# Patient Record
Sex: MALE | Race: BLACK OR AFRICAN AMERICAN | NOT HISPANIC OR LATINO | Employment: OTHER | URBAN - METROPOLITAN AREA
[De-identification: names, ages, dates, MRNs, and addresses within clinical notes are randomized per-mention and may not be internally consistent; named-entity substitution may affect disease eponyms.]

---

## 2017-05-12 ENCOUNTER — ALLSCRIPTS OFFICE VISIT (OUTPATIENT)
Dept: OTHER | Facility: OTHER | Age: 72
End: 2017-05-12

## 2017-05-12 DIAGNOSIS — N19 KIDNEY FAILURE: ICD-10-CM

## 2017-05-15 ENCOUNTER — HOSPITAL ENCOUNTER (OUTPATIENT)
Dept: ULTRASOUND IMAGING | Facility: CLINIC | Age: 72
Discharge: HOME | End: 2017-05-15
Payer: MEDICARE

## 2017-05-15 ENCOUNTER — APPOINTMENT (OUTPATIENT)
Dept: LAB | Facility: CLINIC | Age: 72
End: 2017-05-15
Payer: MEDICARE

## 2017-05-15 ENCOUNTER — TRANSCRIBE ORDERS (OUTPATIENT)
Dept: MRI IMAGING | Facility: CLINIC | Age: 72
End: 2017-05-15

## 2017-05-15 DIAGNOSIS — N19 KIDNEY FAILURE: ICD-10-CM

## 2017-05-15 LAB
ANION GAP SERPL CALCULATED.3IONS-SCNC: 10 MMOL/L (ref 4–13)
BACTERIA UR QL AUTO: NORMAL /HPF
BILIRUB UR QL STRIP: NEGATIVE
BUN SERPL-MCNC: 15 MG/DL (ref 5–25)
CALCIUM SERPL-MCNC: 8.9 MG/DL (ref 8.3–10.1)
CHLORIDE SERPL-SCNC: 102 MMOL/L (ref 100–108)
CLARITY UR: CLEAR
CO2 SERPL-SCNC: 26 MMOL/L (ref 21–32)
COLOR UR: YELLOW
CREAT SERPL-MCNC: 1.33 MG/DL (ref 0.6–1.3)
CREAT UR-MCNC: 216 MG/DL
ERYTHROCYTE [DISTWIDTH] IN BLOOD BY AUTOMATED COUNT: 14.7 % (ref 11.6–15.1)
GFR SERPL CREATININE-BSD FRML MDRD: >60 ML/MIN/1.73SQ M
GLUCOSE P FAST SERPL-MCNC: 236 MG/DL (ref 65–99)
GLUCOSE UR STRIP-MCNC: NEGATIVE MG/DL
HCT VFR BLD AUTO: 35.3 % (ref 36.5–49.3)
HGB BLD-MCNC: 11.5 G/DL (ref 12–17)
HGB UR QL STRIP.AUTO: NEGATIVE
HYALINE CASTS #/AREA URNS LPF: NORMAL /LPF
KETONES UR STRIP-MCNC: NEGATIVE MG/DL
LEUKOCYTE ESTERASE UR QL STRIP: NEGATIVE
MCH RBC QN AUTO: 26.4 PG (ref 26.8–34.3)
MCHC RBC AUTO-ENTMCNC: 32.6 G/DL (ref 31.4–37.4)
MCV RBC AUTO: 81 FL (ref 82–98)
NITRITE UR QL STRIP: NEGATIVE
NON-SQ EPI CELLS URNS QL MICRO: NORMAL /HPF
PH UR STRIP.AUTO: 6 [PH] (ref 4.5–8)
PHOSPHATE SERPL-MCNC: 3.4 MG/DL (ref 2.3–4.1)
PLATELET # BLD AUTO: 328 THOUSANDS/UL (ref 149–390)
PMV BLD AUTO: 11.4 FL (ref 8.9–12.7)
POTASSIUM SERPL-SCNC: 4.2 MMOL/L (ref 3.5–5.3)
PROT UR STRIP-MCNC: ABNORMAL MG/DL
PROT UR-MCNC: 65 MG/DL
PROT/CREAT UR: 0.3 MG/G{CREAT} (ref 0–0.1)
PTH-INTACT SERPL-MCNC: 71.7 PG/ML (ref 14–72)
RBC # BLD AUTO: 4.35 MILLION/UL (ref 3.88–5.62)
RBC #/AREA URNS AUTO: NORMAL /HPF
SODIUM SERPL-SCNC: 138 MMOL/L (ref 136–145)
SP GR UR STRIP.AUTO: 1.02 (ref 1–1.03)
UROBILINOGEN UR QL STRIP.AUTO: 0.2 E.U./DL
WBC # BLD AUTO: 9.67 THOUSAND/UL (ref 4.31–10.16)
WBC #/AREA URNS AUTO: NORMAL /HPF

## 2017-05-15 PROCEDURE — 83970 ASSAY OF PARATHORMONE: CPT

## 2017-05-15 PROCEDURE — 81001 URINALYSIS AUTO W/SCOPE: CPT

## 2017-05-15 PROCEDURE — 76770 US EXAM ABDO BACK WALL COMP: CPT

## 2017-05-15 PROCEDURE — 82570 ASSAY OF URINE CREATININE: CPT

## 2017-05-15 PROCEDURE — 84156 ASSAY OF PROTEIN URINE: CPT

## 2017-05-15 PROCEDURE — 36415 COLL VENOUS BLD VENIPUNCTURE: CPT

## 2017-05-15 PROCEDURE — 85027 COMPLETE CBC AUTOMATED: CPT

## 2017-05-15 PROCEDURE — 80048 BASIC METABOLIC PNL TOTAL CA: CPT

## 2017-05-15 PROCEDURE — 84100 ASSAY OF PHOSPHORUS: CPT

## 2017-06-12 ENCOUNTER — ALLSCRIPTS OFFICE VISIT (OUTPATIENT)
Dept: OTHER | Facility: OTHER | Age: 72
End: 2017-06-12

## 2018-01-10 NOTE — MISCELLANEOUS
Provider Comments  Provider Comments:   PT NO SHOW/MISSED APPOINTMENT HAS NOT CALLED BACK TO RESCHEDULE A LETTER WILL BE SENT HOME TO PT ABOUT MISSED APPOINTMENT        Signatures   Electronically signed by : SARITHA Nguyễn ; Jun 12 2017 11:32AM EST                       (Author)

## 2018-01-15 VITALS
WEIGHT: 240 LBS | HEART RATE: 68 BPM | HEIGHT: 72 IN | DIASTOLIC BLOOD PRESSURE: 70 MMHG | SYSTOLIC BLOOD PRESSURE: 110 MMHG | BODY MASS INDEX: 32.51 KG/M2 | RESPIRATION RATE: 16 BRPM | TEMPERATURE: 98.3 F

## 2021-03-05 ENCOUNTER — HOSPITAL ENCOUNTER (EMERGENCY)
Facility: HOSPITAL | Age: 76
Discharge: HOME | End: 2021-03-05
Attending: EMERGENCY MEDICINE
Payer: MEDICARE

## 2021-03-05 ENCOUNTER — APPOINTMENT (EMERGENCY)
Dept: CT IMAGING | Facility: HOSPITAL | Age: 76
End: 2021-03-05
Payer: MEDICARE

## 2021-03-05 VITALS
TEMPERATURE: 97.8 F | HEART RATE: 97 BPM | DIASTOLIC BLOOD PRESSURE: 87 MMHG | OXYGEN SATURATION: 99 % | RESPIRATION RATE: 21 BRPM | SYSTOLIC BLOOD PRESSURE: 158 MMHG

## 2021-03-05 DIAGNOSIS — K62.89 RECTAL PAIN: ICD-10-CM

## 2021-03-05 DIAGNOSIS — K59.00 CONSTIPATION: Primary | ICD-10-CM

## 2021-03-05 LAB
ALBUMIN SERPL BCP-MCNC: 3.7 G/DL (ref 3.5–5)
ALP SERPL-CCNC: 86 U/L (ref 46–116)
ALT SERPL W P-5'-P-CCNC: 18 U/L (ref 12–78)
ANION GAP SERPL CALCULATED.3IONS-SCNC: 14 MMOL/L (ref 4–13)
AST SERPL W P-5'-P-CCNC: 16 U/L (ref 5–45)
BASOPHILS # BLD AUTO: 0.07 THOUSANDS/ΜL (ref 0–0.1)
BASOPHILS NFR BLD AUTO: 1 % (ref 0–1)
BILIRUB SERPL-MCNC: 0.36 MG/DL (ref 0.2–1)
BUN SERPL-MCNC: 16 MG/DL (ref 5–25)
CALCIUM SERPL-MCNC: 9.1 MG/DL (ref 8.3–10.1)
CHLORIDE SERPL-SCNC: 101 MMOL/L (ref 100–108)
CO2 SERPL-SCNC: 24 MMOL/L (ref 21–32)
CREAT SERPL-MCNC: 1.58 MG/DL (ref 0.6–1.3)
EOSINOPHIL # BLD AUTO: 0.05 THOUSAND/ΜL (ref 0–0.61)
EOSINOPHIL NFR BLD AUTO: 0 % (ref 0–6)
ERYTHROCYTE [DISTWIDTH] IN BLOOD BY AUTOMATED COUNT: 14.7 % (ref 11.6–15.1)
GFR SERPL CREATININE-BSD FRML MDRD: 48 ML/MIN/1.73SQ M
GLUCOSE SERPL-MCNC: 275 MG/DL (ref 65–140)
HCT VFR BLD AUTO: 41.1 % (ref 36.5–49.3)
HGB BLD-MCNC: 12.9 G/DL (ref 12–17)
IMM GRANULOCYTES # BLD AUTO: 0.05 THOUSAND/UL (ref 0–0.2)
IMM GRANULOCYTES NFR BLD AUTO: 0 % (ref 0–2)
LIPASE SERPL-CCNC: 52 U/L (ref 73–393)
LYMPHOCYTES # BLD AUTO: 3.93 THOUSANDS/ΜL (ref 0.6–4.47)
LYMPHOCYTES NFR BLD AUTO: 32 % (ref 14–44)
MCH RBC QN AUTO: 26.7 PG (ref 26.8–34.3)
MCHC RBC AUTO-ENTMCNC: 31.4 G/DL (ref 31.4–37.4)
MCV RBC AUTO: 85 FL (ref 82–98)
MONOCYTES # BLD AUTO: 0.78 THOUSAND/ΜL (ref 0.17–1.22)
MONOCYTES NFR BLD AUTO: 6 % (ref 4–12)
NEUTROPHILS # BLD AUTO: 7.48 THOUSANDS/ΜL (ref 1.85–7.62)
NEUTS SEG NFR BLD AUTO: 61 % (ref 43–75)
NRBC BLD AUTO-RTO: 0 /100 WBCS
PLATELET # BLD AUTO: 335 THOUSANDS/UL (ref 149–390)
PMV BLD AUTO: 10.3 FL (ref 8.9–12.7)
POTASSIUM SERPL-SCNC: 3.9 MMOL/L (ref 3.5–5.3)
PROT SERPL-MCNC: 7.8 G/DL (ref 6.4–8.2)
RBC # BLD AUTO: 4.83 MILLION/UL (ref 3.88–5.62)
SODIUM SERPL-SCNC: 139 MMOL/L (ref 136–145)
WBC # BLD AUTO: 12.36 THOUSAND/UL (ref 4.31–10.16)

## 2021-03-05 PROCEDURE — 96360 HYDRATION IV INFUSION INIT: CPT

## 2021-03-05 PROCEDURE — 99284 EMERGENCY DEPT VISIT MOD MDM: CPT | Performed by: PHYSICIAN ASSISTANT

## 2021-03-05 PROCEDURE — 74177 CT ABD & PELVIS W/CONTRAST: CPT

## 2021-03-05 PROCEDURE — 96361 HYDRATE IV INFUSION ADD-ON: CPT

## 2021-03-05 PROCEDURE — 83690 ASSAY OF LIPASE: CPT | Performed by: PHYSICIAN ASSISTANT

## 2021-03-05 PROCEDURE — 36415 COLL VENOUS BLD VENIPUNCTURE: CPT | Performed by: PHYSICIAN ASSISTANT

## 2021-03-05 PROCEDURE — 85025 COMPLETE CBC W/AUTO DIFF WBC: CPT | Performed by: PHYSICIAN ASSISTANT

## 2021-03-05 PROCEDURE — 80053 COMPREHEN METABOLIC PANEL: CPT | Performed by: PHYSICIAN ASSISTANT

## 2021-03-05 PROCEDURE — 99284 EMERGENCY DEPT VISIT MOD MDM: CPT

## 2021-03-05 RX ORDER — SENNOSIDES 8.6 MG
8.6 TABLET ORAL
Qty: 21 TABLET | Refills: 0 | Status: SHIPPED | OUTPATIENT
Start: 2021-03-05 | End: 2021-03-26

## 2021-03-05 RX ORDER — HYDROCORTISONE 25 MG/G
CREAM TOPICAL 2 TIMES DAILY
Qty: 28 G | Refills: 0 | Status: SHIPPED | OUTPATIENT
Start: 2021-03-05

## 2021-03-05 RX ORDER — POLYETHYLENE GLYCOL 3350 17 G/17G
17 POWDER, FOR SOLUTION ORAL DAILY
Qty: 116 G | Refills: 0 | Status: SHIPPED | OUTPATIENT
Start: 2021-03-05

## 2021-03-05 RX ADMIN — IOHEXOL 100 ML: 350 INJECTION, SOLUTION INTRAVENOUS at 22:24

## 2021-03-05 RX ADMIN — SODIUM CHLORIDE 1000 ML: 0.9 INJECTION, SOLUTION INTRAVENOUS at 20:54

## 2021-03-06 NOTE — ED PROVIDER NOTES
History  Chief Complaint   Patient presents with    Constipation     Pt reports having constipation x 2 weeks  66 yo with constipation  States last BM was about 2 weeks ago  He has had issues with constipation in the past but never gone this long  Has tried OTC meds with no relief  Incidentally upon arrival the pt had a BM  He continues to have abd pain  No n/v  No fever  Complains of pain and inflammation along rectum  History provided by:  Patient   used: No    Constipation  Severity:  Severe  Time since last bowel movement:  2 weeks  Timing:  Constant  Progression:  Worsening  Chronicity:  New  Context: not dehydration, not dietary changes, not medication, not narcotics and not stress    Stool description:  None produced  Relieved by:  Nothing  Worsened by:  Nothing  Ineffective treatments:  None tried  Associated symptoms: no abdominal pain, no diarrhea, no dysuria, no fever, no nausea and no vomiting        None       Past Medical History:   Diagnosis Date    Diabetes mellitus ([facility] [address] )     Hypertension        History reviewed  No pertinent surgical history  History reviewed  No pertinent family history  I have reviewed and agree with the history as documented  E-Cigarette/Vaping    E-Cigarette Use Never User      E-Cigarette/Vaping Substances     Social History     Tobacco Use    Smoking status: Never Smoker    Smokeless tobacco: Never Used   Substance Use Topics    Alcohol use: Never     Frequency: Never    Drug use: Never       Review of Systems   Constitutional: Negative for activity change, appetite change, chills, diaphoresis, fatigue, fever and unexpected weight change  HENT: Negative for congestion, rhinorrhea, sinus pressure, sore throat and trouble swallowing  Eyes: Negative for photophobia and visual disturbance  Respiratory: Negative for apnea, cough, choking, chest tightness, shortness of breath, wheezing and stridor      Cardiovascular: Negative for chest pain, palpitations and leg swelling  Gastrointestinal: Positive for constipation  Negative for abdominal distention, abdominal pain, blood in stool, diarrhea, nausea and vomiting  Genitourinary: Negative for decreased urine volume, difficulty urinating, dysuria, enuresis, flank pain, frequency, hematuria and urgency  Musculoskeletal: Negative for arthralgias, myalgias, neck pain and neck stiffness  Skin: Negative for color change, pallor, rash and wound  Allergic/Immunologic: Negative  Neurological: Negative for dizziness, tremors, syncope, weakness, light-headedness, numbness and headaches  Hematological: Negative  Psychiatric/Behavioral: Negative  All other systems reviewed and are negative  Physical Exam  Physical Exam  Vitals signs and nursing note reviewed  Constitutional:       General: He is not in acute distress  Appearance: Normal appearance  He is well-developed  He is not ill-appearing, toxic-appearing or diaphoretic  HENT:      Head: Normocephalic and atraumatic  Eyes:      General: Lids are normal       Pupils: Pupils are equal, round, and reactive to light  Neck:      Musculoskeletal: Normal range of motion and neck supple  Cardiovascular:      Rate and Rhythm: Normal rate and regular rhythm  Pulses: Normal pulses  No decreased pulses  Radial pulses are 2+ on the right side and 2+ on the left side  Heart sounds: Normal heart sounds, S1 normal and S2 normal  Heart sounds not distant  No murmur  No friction rub  No gallop  Pulmonary:      Effort: Pulmonary effort is normal  No tachypnea, bradypnea, accessory muscle usage or respiratory distress  Breath sounds: Normal breath sounds  No decreased breath sounds, wheezing, rhonchi or rales  Abdominal:      General: There is no distension  Palpations: Abdomen is soft  Abdomen is not rigid  Tenderness: There is generalized abdominal tenderness   There is no guarding or rebound  Musculoskeletal: Normal range of motion  General: No tenderness or deformity  Skin:     General: Skin is warm and dry  Coloration: Skin is not pale  Findings: No erythema or rash  Neurological:      Mental Status: He is alert and oriented to person, place, and time  GCS: GCS eye subscore is 4  GCS verbal subscore is 5  GCS motor subscore is 6  Cranial Nerves: No cranial nerve deficit     Psychiatric:         Speech: Speech normal          Vital Signs  ED Triage Vitals [03/05/21 1620]   Temperature Pulse Respirations Blood Pressure SpO2   97 8 °F (36 6 °C) 102 18 161/86 99 %      Temp Source Heart Rate Source Patient Position - Orthostatic VS BP Location FiO2 (%)   Oral Monitor Sitting Left arm --      Pain Score       --           Vitals:    03/05/21 1620 03/05/21 2325   BP: 161/86 158/87   Pulse: 102 97   Patient Position - Orthostatic VS: Sitting Lying         Visual Acuity      ED Medications  Medications   sodium chloride 0 9 % bolus 1,000 mL (0 mL Intravenous Stopped 3/5/21 2300)   iohexol (OMNIPAQUE) 350 MG/ML injection (MULTI-DOSE) 100 mL (100 mL Intravenous Given 3/5/21 2224)       Diagnostic Studies  Results Reviewed     Procedure Component Value Units Date/Time    Lipase [[id number]]  (Abnormal) Collected: 03/05/21 2052    Lab Status: Final result Specimen: Blood from Arm, Right Updated: 03/05/21 2109     Lipase 52 u/L     Comprehensive metabolic panel [[id number]]  (Abnormal) Collected: 03/05/21 2052    Lab Status: Final result Specimen: Blood from Arm, Right Updated: 03/05/21 2109     Sodium 139 mmol/L      Potassium 3 9 mmol/L      Chloride 101 mmol/L      CO2 24 mmol/L      ANION GAP 14 mmol/L      BUN 16 mg/dL      Creatinine 1 58 mg/dL      Glucose 275 mg/dL      Calcium 9 1 mg/dL      AST 16 U/L      ALT 18 U/L      Alkaline Phosphatase 86 U/L      Total Protein 7 8 g/dL      Albumin 3 7 g/dL      Total Bilirubin 0 36 mg/dL      eGFR 48 ml/min/1 73sq m Narrative:      National Kidney Disease Foundation guidelines for Chronic Kidney Disease (CKD):     Stage 1 with normal or high GFR (GFR > 90 mL/min/1 73 square meters)    Stage 2 Mild CKD (GFR = 60-89 mL/min/1 73 square meters)    Stage 3A Moderate CKD (GFR = 45-59 mL/min/1 73 square meters)    Stage 3B Moderate CKD (GFR = 30-44 mL/min/1 73 square meters)    Stage 4 Severe CKD (GFR = 15-29 mL/min/1 73 square meters)    Stage 5 End Stage CKD (GFR <15 mL/min/1 73 square meters)  Note: GFR calculation is accurate only with a steady state creatinine    CBC and differential [[id number]]  (Abnormal) Collected: 03/05/21 2052    Lab Status: Final result Specimen: Blood from Arm, Right Updated: 03/05/21 2056     WBC 12 36 Thousand/uL      RBC 4 83 Million/uL      Hemoglobin 12 9 g/dL      Hematocrit 41 1 %      MCV 85 fL      MCH 26 7 pg      MCHC 31 4 g/dL      RDW 14 7 %      MPV 10 3 fL      Platelets 346 Thousands/uL      nRBC 0 /100 WBCs      Neutrophils Relative 61 %      Immat GRANS % 0 %      Lymphocytes Relative 32 %      Monocytes Relative 6 %      Eosinophils Relative 0 %      Basophils Relative 1 %      Neutrophils Absolute 7 48 Thousands/µL      Immature Grans Absolute 0 05 Thousand/uL      Lymphocytes Absolute 3 93 Thousands/µL      Monocytes Absolute 0 78 Thousand/µL      Eosinophils Absolute 0 05 Thousand/µL      Basophils Absolute 0 07 Thousands/µL                  CT abdomen pelvis with contrast   Final Result by Morales Patiño MD (03/05 2326)      1  Rectum is distended with fecal material and there is circumferential rectal wall thickening with surrounding fat stranding suspicious for stercoral colitis  2   Abundant fecal material throughout the colon, in keeping with history of constipation  The study was marked in [facility] for immediate notification              Workstation performed: [id number]                    Procedures  Procedures         ED Course MDM  Number of Diagnoses or Management Options  Constipation: new and requires workup  Rectal pain: new and requires workup  Diagnosis management comments: DDx including but not limited to: appendicitis, gastroenteritis, gastritis, PUD, GERD, gastroparesis, hepatitis, pancreatitis, colitis, enteritis, diverticulitis, food poisoning, mesenteric adenitis, mesenteric ischemia, IBD, IBS, ileus, bowel obstruction, volvulus, internal hernia, cholecystitis, biliary colic, choledocholithiasis, perforated viscus, tumor, splenic etiology, constipation, AAA, testicular torsion, renal colic, pyelonephritis, UTI; doubt cardiac etiology  Plan: Had a bm here  Will check CT  Labs  Fluids  Dispo pending  Amount and/or Complexity of Data Reviewed  Clinical lab tests: ordered and reviewed  Tests in the radiology section of CPT®: ordered and reviewed  Independent visualization of images, tracings, or specimens: yes    Risk of Complications, Morbidity, and/or Mortality  Presenting problems: moderate  Management options: low  General comments: 66 yo with abd pain and constipation  Had a BM here without any intervention  Feeling better  CT still with persistent stool burden  Will start miralax  Follow up PCP  Senokot  Follow up PCP  Anusol for rectal pain/proctitis  Incidental CT findings discussed with pt including renal cysts  Return parameters provided  Pt understands and agrees with plan        Patient Progress  Patient progress: stable      Disposition  Final diagnoses:   Constipation   Rectal pain     Time reflects when diagnosis was documented in both MDM as applicable and the Disposition within this note     Time User Action Codes Description Comment    3/5/2021 11:34 PM Tristan Foil Add [K59 00] Constipation     3/5/2021 11:34 PM Tristan Foil Add [K62 89] Rectal pain       ED Disposition     ED Disposition Condition Date/Time Comment    Discharge Stable Fri Mar 5, 2021 11:34 PM Lemuel Mondragon discharge to home/self care  Follow-up Information     Follow up With Specialties Details Why [address] MD Internal Medicine Call in 1 day  [address] [address]  [phone]            Discharge Medication List as of 3/5/2021 11:36 PM      START taking these medications    Details   hydrocortisone (ANUSOL-HC) 2 5 % rectal cream Apply topically 2 (two) times a day, Starting Fri 3/5/2021, Print      polyethylene glycol (GLYCOLAX) 17 GM/SCOOP powder Take 17 g by mouth daily, Starting Fri 3/5/2021, Print      senna (SENOKOT) 8 6 mg Take 1 tablet (8 6 mg total) by mouth daily at bedtime for 21 days, Starting Fri 3/5/2021, Until Fri 3/26/2021, Print           No discharge procedures on file      PDMP Review     None          ED Provider  Electronically Signed by           Thea Mukherjee PA-C  03/06/21 0008

## 2024-09-17 ENCOUNTER — APPOINTMENT (EMERGENCY)
Dept: RADIOLOGY | Facility: HOSPITAL | Age: 79
DRG: 178 | End: 2024-09-17
Payer: COMMERCIAL

## 2024-09-17 ENCOUNTER — HOSPITAL ENCOUNTER (INPATIENT)
Facility: HOSPITAL | Age: 79
LOS: 2 days | Discharge: HOME HEALTH | DRG: 178 | End: 2024-09-19
Attending: EMERGENCY MEDICINE | Admitting: STUDENT IN AN ORGANIZED HEALTH CARE EDUCATION/TRAINING PROGRAM
Payer: COMMERCIAL

## 2024-09-17 ENCOUNTER — APPOINTMENT (EMERGENCY)
Dept: CT IMAGING | Facility: HOSPITAL | Age: 79
DRG: 178 | End: 2024-09-17
Payer: COMMERCIAL

## 2024-09-17 ENCOUNTER — APPOINTMENT (INPATIENT)
Dept: NON INVASIVE DIAGNOSTICS | Facility: HOSPITAL | Age: 79
DRG: 178 | End: 2024-09-17
Payer: COMMERCIAL

## 2024-09-17 DIAGNOSIS — G25.3 MYOCLONUS: ICD-10-CM

## 2024-09-17 DIAGNOSIS — I10 HTN (HYPERTENSION): ICD-10-CM

## 2024-09-17 DIAGNOSIS — U07.1 ACUTE COVID-19: Primary | ICD-10-CM

## 2024-09-17 DIAGNOSIS — N17.9 AKI (ACUTE KIDNEY INJURY) (HCC): ICD-10-CM

## 2024-09-17 DIAGNOSIS — R53.1 WEAKNESS: ICD-10-CM

## 2024-09-17 DIAGNOSIS — R25.1 TREMOR: ICD-10-CM

## 2024-09-17 DIAGNOSIS — E08.21 DIABETES MELLITUS DUE TO UNDERLYING CONDITION WITH DIABETIC NEPHROPATHY, UNSPECIFIED WHETHER LONG TERM INSULIN USE (HCC): ICD-10-CM

## 2024-09-17 DIAGNOSIS — U07.1 COVID: ICD-10-CM

## 2024-09-17 DIAGNOSIS — E11.9 DM (DIABETES MELLITUS) (HCC): ICD-10-CM

## 2024-09-17 LAB
2HR DELTA HS TROPONIN: 0 NG/L
ALBUMIN SERPL BCG-MCNC: 3.9 G/DL (ref 3.5–5)
ALP SERPL-CCNC: 57 U/L (ref 34–104)
ALT SERPL W P-5'-P-CCNC: 7 U/L (ref 7–52)
ANION GAP SERPL CALCULATED.3IONS-SCNC: 9 MMOL/L (ref 4–13)
AORTIC ROOT: 3.2 CM
APICAL FOUR CHAMBER EJECTION FRACTION: 72 %
APTT PPP: 20 SECONDS (ref 23–34)
ASCENDING AORTA: 3.7 CM
AST SERPL W P-5'-P-CCNC: 17 U/L (ref 13–39)
ATRIAL RATE: 70 BPM
BACTERIA UR QL AUTO: NORMAL /HPF
BASOPHILS # BLD AUTO: 0.05 THOUSANDS/ΜL (ref 0–0.1)
BASOPHILS NFR BLD AUTO: 1 % (ref 0–1)
BILIRUB SERPL-MCNC: 0.47 MG/DL (ref 0.2–1)
BILIRUB UR QL STRIP: NEGATIVE
BSA FOR ECHO PROCEDURE: 2.22 M2
BUN SERPL-MCNC: 15 MG/DL (ref 5–25)
CALCIUM SERPL-MCNC: 8.6 MG/DL (ref 8.4–10.2)
CARDIAC TROPONIN I PNL SERPL HS: 4 NG/L
CARDIAC TROPONIN I PNL SERPL HS: 4 NG/L
CHLORIDE SERPL-SCNC: 102 MMOL/L (ref 96–108)
CLARITY UR: CLEAR
CO2 SERPL-SCNC: 26 MMOL/L (ref 21–32)
COLOR UR: YELLOW
CREAT SERPL-MCNC: 1.38 MG/DL (ref 0.6–1.3)
E WAVE DECELERATION TIME: 417 MS
E/A RATIO: 0.71
EOSINOPHIL # BLD AUTO: 0.17 THOUSAND/ΜL (ref 0–0.61)
EOSINOPHIL NFR BLD AUTO: 2 % (ref 0–6)
ERYTHROCYTE [DISTWIDTH] IN BLOOD BY AUTOMATED COUNT: 13.1 % (ref 11.6–15.1)
FLUAV RNA RESP QL NAA+PROBE: NEGATIVE
FLUBV RNA RESP QL NAA+PROBE: NEGATIVE
FRACTIONAL SHORTENING: 43 (ref 28–44)
GFR SERPL CREATININE-BSD FRML MDRD: 48 ML/MIN/1.73SQ M
GLUCOSE SERPL-MCNC: 103 MG/DL (ref 65–140)
GLUCOSE SERPL-MCNC: 141 MG/DL (ref 65–140)
GLUCOSE SERPL-MCNC: 161 MG/DL (ref 65–140)
GLUCOSE UR STRIP-MCNC: NEGATIVE MG/DL
HCT VFR BLD AUTO: 36.9 % (ref 36.5–49.3)
HGB BLD-MCNC: 11.8 G/DL (ref 12–17)
HGB UR QL STRIP.AUTO: NEGATIVE
IMM GRANULOCYTES # BLD AUTO: 0.04 THOUSAND/UL (ref 0–0.2)
IMM GRANULOCYTES NFR BLD AUTO: 1 % (ref 0–2)
INR PPP: 0.98 (ref 0.85–1.19)
INTERVENTRICULAR SEPTUM IN DIASTOLE (PARASTERNAL SHORT AXIS VIEW): 1 CM
INTERVENTRICULAR SEPTUM: 1 CM (ref 0.6–1.1)
KETONES UR STRIP-MCNC: NEGATIVE MG/DL
LA/AORTA RATIO 2D: 1.22
LAAS-AP2: 10.2 CM2
LAAS-AP4: 22.9 CM2
LACTATE SERPL-SCNC: 1 MMOL/L (ref 0.5–2)
LEFT ATRIUM SIZE: 3.9 CM
LEFT INTERNAL DIMENSION IN SYSTOLE: 3.3 CM (ref 2.1–4)
LEFT VENTRICULAR INTERNAL DIMENSION IN DIASTOLE: 5.8 CM (ref 3.5–6)
LEFT VENTRICULAR POSTERIOR WALL IN END DIASTOLE: 1 CM
LEFT VENTRICULAR STROKE VOLUME: 125 ML
LEUKOCYTE ESTERASE UR QL STRIP: NEGATIVE
LVSV (TEICH): 125 ML
LYMPHOCYTES # BLD AUTO: 2.7 THOUSANDS/ΜL (ref 0.6–4.47)
LYMPHOCYTES NFR BLD AUTO: 36 % (ref 14–44)
MAGNESIUM SERPL-MCNC: 1.9 MG/DL (ref 1.9–2.7)
MCH RBC QN AUTO: 27.1 PG (ref 26.8–34.3)
MCHC RBC AUTO-ENTMCNC: 32 G/DL (ref 31.4–37.4)
MCV RBC AUTO: 85 FL (ref 82–98)
MONOCYTES # BLD AUTO: 0.67 THOUSAND/ΜL (ref 0.17–1.22)
MONOCYTES NFR BLD AUTO: 9 % (ref 4–12)
MV E'TISSUE VEL-SEP: 5 CM/S
MV PEAK A VEL: 0.89 M/S
MV PEAK E VEL: 63 CM/S
MV STENOSIS PRESSURE HALF TIME: 122 MS
MV VALVE AREA P 1/2 METHOD: 1.8
NEUTROPHILS # BLD AUTO: 3.94 THOUSANDS/ΜL (ref 1.85–7.62)
NEUTS SEG NFR BLD AUTO: 51 % (ref 43–75)
NITRITE UR QL STRIP: NEGATIVE
NON-SQ EPI CELLS URNS QL MICRO: NORMAL /HPF
NRBC BLD AUTO-RTO: 0 /100 WBCS
P AXIS: 37 DEGREES
PH UR STRIP.AUTO: 5.5 [PH]
PLATELET # BLD AUTO: 247 THOUSANDS/UL (ref 149–390)
PMV BLD AUTO: 9.4 FL (ref 8.9–12.7)
POTASSIUM SERPL-SCNC: 4.1 MMOL/L (ref 3.5–5.3)
PR INTERVAL: 188 MS
PROT SERPL-MCNC: 7 G/DL (ref 6.4–8.4)
PROT UR STRIP-MCNC: ABNORMAL MG/DL
PROTHROMBIN TIME: 13.7 SECONDS (ref 12.3–15)
QRS AXIS: -57 DEGREES
QRSD INTERVAL: 124 MS
QT INTERVAL: 438 MS
QTC INTERVAL: 486 MS
RBC # BLD AUTO: 4.36 MILLION/UL (ref 3.88–5.62)
RBC #/AREA URNS AUTO: NORMAL /HPF
RIGHT VENTRICLE ID DIMENSION: 3.1 CM
RSV RNA RESP QL NAA+PROBE: NEGATIVE
SARS-COV-2 RNA RESP QL NAA+PROBE: POSITIVE
SL CV LV EF: 55
SL CV PED ECHO LEFT VENTRICLE DIASTOLIC VOLUME (MOD BIPLANE) 2D: 168 ML
SL CV PED ECHO LEFT VENTRICLE SYSTOLIC VOLUME (MOD BIPLANE) 2D: 43 ML
SODIUM SERPL-SCNC: 137 MMOL/L (ref 135–147)
SP GR UR STRIP.AUTO: 1.02 (ref 1–1.03)
T WAVE AXIS: 40 DEGREES
TRICUSPID ANNULAR PLANE SYSTOLIC EXCURSION: 2.3 CM
TSH SERPL DL<=0.05 MIU/L-ACNC: 2.08 UIU/ML (ref 0.45–4.5)
UROBILINOGEN UR STRIP-ACNC: <2 MG/DL
VENTRICULAR RATE: 74 BPM
WBC # BLD AUTO: 7.57 THOUSAND/UL (ref 4.31–10.16)
WBC #/AREA URNS AUTO: NORMAL /HPF

## 2024-09-17 PROCEDURE — 70496 CT ANGIOGRAPHY HEAD: CPT

## 2024-09-17 PROCEDURE — 99285 EMERGENCY DEPT VISIT HI MDM: CPT

## 2024-09-17 PROCEDURE — 84484 ASSAY OF TROPONIN QUANT: CPT | Performed by: EMERGENCY MEDICINE

## 2024-09-17 PROCEDURE — 81001 URINALYSIS AUTO W/SCOPE: CPT | Performed by: EMERGENCY MEDICINE

## 2024-09-17 PROCEDURE — 93005 ELECTROCARDIOGRAM TRACING: CPT

## 2024-09-17 PROCEDURE — 70498 CT ANGIOGRAPHY NECK: CPT

## 2024-09-17 PROCEDURE — 36415 COLL VENOUS BLD VENIPUNCTURE: CPT | Performed by: EMERGENCY MEDICINE

## 2024-09-17 PROCEDURE — 99223 1ST HOSP IP/OBS HIGH 75: CPT | Performed by: STUDENT IN AN ORGANIZED HEALTH CARE EDUCATION/TRAINING PROGRAM

## 2024-09-17 PROCEDURE — 82948 REAGENT STRIP/BLOOD GLUCOSE: CPT

## 2024-09-17 PROCEDURE — 85730 THROMBOPLASTIN TIME PARTIAL: CPT | Performed by: EMERGENCY MEDICINE

## 2024-09-17 PROCEDURE — 85610 PROTHROMBIN TIME: CPT | Performed by: EMERGENCY MEDICINE

## 2024-09-17 PROCEDURE — 85025 COMPLETE CBC W/AUTO DIFF WBC: CPT | Performed by: EMERGENCY MEDICINE

## 2024-09-17 PROCEDURE — 0241U HB NFCT DS VIR RESP RNA 4 TRGT: CPT | Performed by: EMERGENCY MEDICINE

## 2024-09-17 PROCEDURE — 93306 TTE W/DOPPLER COMPLETE: CPT | Performed by: INTERNAL MEDICINE

## 2024-09-17 PROCEDURE — 80053 COMPREHEN METABOLIC PANEL: CPT | Performed by: EMERGENCY MEDICINE

## 2024-09-17 PROCEDURE — 96365 THER/PROPH/DIAG IV INF INIT: CPT

## 2024-09-17 PROCEDURE — 84443 ASSAY THYROID STIM HORMONE: CPT | Performed by: EMERGENCY MEDICINE

## 2024-09-17 PROCEDURE — 83735 ASSAY OF MAGNESIUM: CPT | Performed by: EMERGENCY MEDICINE

## 2024-09-17 PROCEDURE — 71046 X-RAY EXAM CHEST 2 VIEWS: CPT

## 2024-09-17 PROCEDURE — 93306 TTE W/DOPPLER COMPLETE: CPT

## 2024-09-17 PROCEDURE — 96361 HYDRATE IV INFUSION ADD-ON: CPT

## 2024-09-17 PROCEDURE — 99285 EMERGENCY DEPT VISIT HI MDM: CPT | Performed by: EMERGENCY MEDICINE

## 2024-09-17 PROCEDURE — 83605 ASSAY OF LACTIC ACID: CPT | Performed by: EMERGENCY MEDICINE

## 2024-09-17 PROCEDURE — 93010 ELECTROCARDIOGRAM REPORT: CPT | Performed by: INTERNAL MEDICINE

## 2024-09-17 RX ORDER — ASPIRIN 81 MG/1
324 TABLET, CHEWABLE ORAL DAILY
Qty: 20 TABLET | Refills: 0 | Status: SHIPPED | OUTPATIENT
Start: 2024-09-17

## 2024-09-17 RX ORDER — ASPIRIN 81 MG/1
81 TABLET, CHEWABLE ORAL DAILY
Status: DISCONTINUED | OUTPATIENT
Start: 2024-09-18 | End: 2024-09-19 | Stop reason: HOSPADM

## 2024-09-17 RX ORDER — INSULIN LISPRO 100 [IU]/ML
1-5 INJECTION, SOLUTION INTRAVENOUS; SUBCUTANEOUS
Status: DISCONTINUED | OUTPATIENT
Start: 2024-09-17 | End: 2024-09-19 | Stop reason: HOSPADM

## 2024-09-17 RX ORDER — LOSARTAN POTASSIUM 25 MG/1
25 TABLET ORAL DAILY
Status: DISCONTINUED | OUTPATIENT
Start: 2024-09-17 | End: 2024-09-19 | Stop reason: HOSPADM

## 2024-09-17 RX ORDER — CLOPIDOGREL BISULFATE 75 MG/1
300 TABLET ORAL ONCE
Status: COMPLETED | OUTPATIENT
Start: 2024-09-17 | End: 2024-09-17

## 2024-09-17 RX ORDER — ASPIRIN 325 MG
325 TABLET ORAL ONCE
Status: COMPLETED | OUTPATIENT
Start: 2024-09-17 | End: 2024-09-17

## 2024-09-17 RX ORDER — ACETAMINOPHEN 325 MG/1
650 TABLET ORAL EVERY 6 HOURS PRN
Status: DISCONTINUED | OUTPATIENT
Start: 2024-09-17 | End: 2024-09-19 | Stop reason: HOSPADM

## 2024-09-17 RX ORDER — ATORVASTATIN CALCIUM 40 MG/1
40 TABLET, FILM COATED ORAL EVERY EVENING
Status: DISCONTINUED | OUTPATIENT
Start: 2024-09-17 | End: 2024-09-19 | Stop reason: HOSPADM

## 2024-09-17 RX ORDER — SODIUM CHLORIDE 9 MG/ML
75 INJECTION, SOLUTION INTRAVENOUS CONTINUOUS
Status: DISCONTINUED | OUTPATIENT
Start: 2024-09-17 | End: 2024-09-19

## 2024-09-17 RX ORDER — AMLODIPINE BESYLATE 10 MG/1
10 TABLET ORAL DAILY
Status: DISCONTINUED | OUTPATIENT
Start: 2024-09-17 | End: 2024-09-19 | Stop reason: HOSPADM

## 2024-09-17 RX ORDER — MAGNESIUM SULFATE HEPTAHYDRATE 40 MG/ML
2 INJECTION, SOLUTION INTRAVENOUS ONCE
Status: COMPLETED | OUTPATIENT
Start: 2024-09-17 | End: 2024-09-17

## 2024-09-17 RX ORDER — HEPARIN SODIUM 5000 [USP'U]/ML
5000 INJECTION, SOLUTION INTRAVENOUS; SUBCUTANEOUS EVERY 8 HOURS SCHEDULED
Status: DISCONTINUED | OUTPATIENT
Start: 2024-09-17 | End: 2024-09-19 | Stop reason: HOSPADM

## 2024-09-17 RX ADMIN — LOSARTAN POTASSIUM 25 MG: 25 TABLET, FILM COATED ORAL at 16:27

## 2024-09-17 RX ADMIN — MAGNESIUM SULFATE HEPTAHYDRATE 2 G: 40 INJECTION, SOLUTION INTRAVENOUS at 09:33

## 2024-09-17 RX ADMIN — IOHEXOL 85 ML: 350 INJECTION, SOLUTION INTRAVENOUS at 10:28

## 2024-09-17 RX ADMIN — ATORVASTATIN CALCIUM 40 MG: 40 TABLET, FILM COATED ORAL at 18:36

## 2024-09-17 RX ADMIN — ASPIRIN 325 MG ORAL TABLET 325 MG: 325 PILL ORAL at 16:27

## 2024-09-17 RX ADMIN — HEPARIN SODIUM 5000 UNITS: 5000 INJECTION INTRAVENOUS; SUBCUTANEOUS at 16:27

## 2024-09-17 RX ADMIN — SODIUM CHLORIDE 150 ML/HR: 0.9 INJECTION, SOLUTION INTRAVENOUS at 09:31

## 2024-09-17 RX ADMIN — INSULIN DETEMIR 30 UNITS: 100 INJECTION, SOLUTION SUBCUTANEOUS at 22:00

## 2024-09-17 RX ADMIN — CLOPIDOGREL 300 MG: 75 TABLET ORAL at 16:27

## 2024-09-17 RX ADMIN — HEPARIN SODIUM 5000 UNITS: 5000 INJECTION INTRAVENOUS; SUBCUTANEOUS at 22:04

## 2024-09-17 RX ADMIN — AMLODIPINE BESYLATE 10 MG: 10 TABLET ORAL at 16:27

## 2024-09-17 NOTE — PLAN OF CARE
Problem: Potential for Falls  Goal: Patient will remain free of falls  Description: INTERVENTIONS:  - Educate patient/family on patient safety including physical limitations  - Instruct patient to call for assistance with activity   - Consult OT/PT to assist with strengthening/mobility   - Keep Call bell within reach  - Keep bed low and locked with side rails adjusted as appropriate  - Keep care items and personal belongings within reach  - Initiate and maintain comfort rounds  - Make Fall Risk Sign visible to staff  - Offer Toileting every 2 Hours, in advance of need  - Initiate/Maintain bed alarm  - Obtain necessary fall risk management equipment  - Apply yellow socks and bracelet for high fall risk patients  - Consider moving patient to room near nurses station  Reactivated     Problem: Prexisting or High Potential for Compromised Skin Integrity  Goal: Skin integrity is maintained or improved  Description: INTERVENTIONS:  - Identify patients at risk for skin breakdown  - Assess and monitor skin integrity  - Assess and monitor nutrition and hydration status  - Monitor labs   - Assess for incontinence   - Turn and reposition patient  - Assist with mobility/ambulation  - Relieve pressure over bony prominences  - Avoid friction and shearing  - Provide appropriate hygiene as needed including keeping skin clean and dry  - Evaluate need for skin moisturizer/barrier cream  - Collaborate with interdisciplinary team   - Patient/family teaching  - Consider wound care consult   Reactivated     Problem: Neurological Deficit  Goal: Neurological status is stable or improving  Description: Interventions:  - Monitor and assess patient's level of consciousness, motor function, sensory function, and level of assistance needed for ADLs.   - Monitor and report changes from baseline. Collaborate with interdisciplinary team to initiate plan and implement interventions as ordered.   - Provide and maintain a safe environment.  -  Consider seizure precautions.  - Consider fall precautions.  - Consider aspiration precautions.  - Consider bleeding precautions.  Reactivated     Problem: Activity Intolerance/Impaired Mobility  Goal: Mobility/activity is maintained at optimum level for patient  Description: Interventions:  - Assess and monitor patient  barriers to mobility and need for assistive/adaptive devices.  - Assess patient's emotional response to limitations.  - Collaborate with interdisciplinary team and initiate plans and interventions as ordered.  - Encourage independent activity per ability.  - Maintain proper body alignment.  - Perform active/passive rom as tolerated/ordered.  - Plan activities to conserve energy.  - Turn patient as appropriate  Reactivated     Problem: Communication Impairment  Goal: Ability to express needs and understand communication  Description: Assess patient's communication skills and ability to understand information.  Patient will demonstrate use of effective communication techniques, alternative methods of communication and understanding even if not able to speak.     - Encourage communication and provide alternate methods of communication as needed.  - Collaborate with case management/ for discharge needs.  - Include patient/family/caregiver in decisions related to communication.  Reactivated     Problem: Potential for Aspiration  Goal: Non-ventilated patient's risk of aspiration is minimized  Description: Assess and monitor vital signs, respiratory status, and labs (WBC).  Monitor for signs of aspiration (tachypnea, cough, rales, wheezing, cyanosis, fever).    - Assess and monitor patient's ability to swallow.  - Place patient up in chair to eat if possible.  - HOB up at 90 degrees to eat if unable to get patient up into chair.  - Supervise patient during oral intake.   - Instruct patient/ family to take small bites.  - Instruct patient/ family to take small single sips when taking  liquids.  - Follow patient-specific strategies generated by speech pathologist.  Reactivated  Goal: Ventilated patient's risk of aspiration is minimized  Description: Assess and monitor vital signs, respiratory status, airway cuff pressure, and labs (WBC).  Monitor for signs of aspiration (tachypnea, cough, rales, wheezing, cyanosis, fever).    - Elevate head of bed 30 degrees if patient has tube feeding.  - Monitor tube feeding.  Reactivated     Problem: Nutrition  Goal: Nutrition/Hydration status is improving  Description: Monitor and assess patient's nutrition/hydration status for malnutrition (ex- brittle hair, bruises, dry skin, pale skin and conjunctiva, muscle wasting, smooth red tongue, and disorientation). Collaborate with interdisciplinary team and initiate plan and interventions as ordered.  Monitor patient's weight and dietary intake as ordered or per policy. Utilize nutrition screening tool and intervene per policy. Determine patient's food preferences and provide high-protein, high-caloric foods as appropriate.     - Assist patient with eating.  - Allow adequate time for meals.  - Encourage patient to take dietary supplement as ordered.  - Collaborate with clinical nutritionist.  - Include patient/family/caregiver in decisions related to nutrition.  Reactivated     Problem: Knowledge Deficit  Goal: Patient/family/caregiver demonstrates understanding of disease process, treatment plan, medications, and discharge instructions  Description: Complete learning assessment and assess knowledge base.  Interventions:  - Provide teaching at level of understanding  - Provide teaching via preferred learning methods  Reactivated     Problem: NEUROSENSORY - ADULT  Goal: Achieves stable or improved neurological status  Description: INTERVENTIONS  - Monitor and report changes in neurological status  - Monitor vital signs such as temperature, blood pressure, glucose, and any other labs ordered   - Initiate measures to  prevent increased intracranial pressure  - Monitor for seizure activity and implement precautions if appropriate      Reactivated  Goal: Remains free of injury related to seizures activity  Description: INTERVENTIONS  - Maintain airway, patient safety  and administer oxygen as ordered  - Monitor patient for seizure activity, document and report duration and description of seizure to physician/advanced practitioner  - If seizure occurs,  ensure patient safety during seizure  - Reorient patient post seizure  - Seizure pads on all 4 side rails  - Instruct patient/family to notify RN of any seizure activity including if an aura is experienced  - Instruct patient/family to call for assistance with activity based on nursing assessment  - Administer anti-seizure medications if ordered    Reactivated  Goal: Achieves maximal functionality and self care  Description: INTERVENTIONS  - Monitor swallowing and airway patency with patient fatigue and changes in neurological status  - Encourage and assist patient to increase activity and self care.   - Encourage visually impaired, hearing impaired and aphasic patients to use assistive/communication devices  Reactivated

## 2024-09-17 NOTE — ASSESSMENT & PLAN NOTE
Patient presenting with weakness, shaking/tremor and fatgiue and found to be COVID-positive  Not requiring oxygen  Wife was recently sick with similar illness  Monitor for fevers, PRN tylenol

## 2024-09-17 NOTE — ASSESSMENT & PLAN NOTE
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  No additional toxic metabolic derangements have been identified at this time.    Continue evaluation of toxic/metabolic derangements  Clarify if there has been any recent medication changes, addition of any medications.   Patient does take Glycolax, ensure that he is taking as prescribed

## 2024-09-17 NOTE — ASSESSMENT & PLAN NOTE
78 y.o. male with HTN, DM2, and diabetic neuropathy who presented to the ED with L>R shaking, weakness and fatigue since 9/12/24. Patient diagnosed with Covid in the ED.     Work-up:  /88 on arrival.   Covid positive. Influenza/RSV negative.  Labs notable for BUN/Cr 15/1.38, GFR 48.  LFTS WNL  UA negative  CTA H/N w wo:   Negative for acute intracranial abnormality.  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  Atherosclerotic stenosis of bilateral cavernous and supraclinoid intracranial ICAs.  Severe stenosis in the distal left cavernous ICA.  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 Patient able to suppress movements. Additionally patient with intermittent proximal LLE giveway weakness during myoclonic jerks. Patient also with exam findings consistent with known diabetic neuropathy. No additional focal neurologic deficits. Suspect myoclonus to be secondary to infection and JOIE, though would recommend additional work-up to evaluate for any other toxic/metabolic derangements and clarification with patient to ensure no medication changes/additions and that he is taking his Glycolax correctly.   Also of note, patient does have multiple occluded/severely stenosis intracranial vessels and reports not being compliant with daily Aspirin. Due to this, along with patient's LLE giveway weakness and his reports of unsteady gait, will also obtain and MRI to rule out stroke. Recommendations as detailed below.    Plan:  Acute ischemic stroke protocol  Load with Aspirin 325mg and Plavix 300mg  Continue Aspirin 81mg and Plavix 75mg daily beginning 9/18  Atorvastatin 40mg  IVF  MRI brain  without contrast   Echo   Telemetry  Lipid panel, HbA1C, TSH, folate, B12  Secondary stroke risk factor modification  As symptoms started on 9/12, no need for permissive hypertension however would recommend gradual lowering of BP with a goal today of systolics less than 180s and an eventual goal of normotension.  Avoid hypotension  Goal of normothermia and euglycemia   PT/OT/ST  Stroke Education  Frequent neurological checks  Stat CT head with worsening in neuro exam  Notify neurology with any changes in exam   Medical management as per primary team.  Recommend continue evaluating for toxic/metabolic derangements

## 2024-09-17 NOTE — ASSESSMENT & PLAN NOTE
"No results found for: \"HGBA1C\"    No results for input(s): \"POCGLU\" in the last 72 hours.    Blood Sugar Average: Last 72 hrs:    History of diabetes reported  Patient reported that he takes insulin detemir 44 units in the morning and 50 units at night.  However per documentation and last office visit he was only recorded that he takes 44 units daily.  He did report he took morning determir insulin already today.   Will order 30 u BID for now and monitor for hypoglycemia   Sliding scale is also ordered   "

## 2024-09-17 NOTE — CONSULTS
Consultation - Neurology   Name: Reyes Lira 78 y.o. male I MRN: [id number]  Unit/Bed#: 2 E 279-01 I Date of Admission: 9/17/2024   Date of Service: 9/17/2024 I Hospital Day: 0   Inpatient consult to Neurology  Consult performed by: Brittany Marcial PA-C  Consult ordered by: Sergei Real MD        Physician Requesting Evaluation: Sergei Real MD   Reason for Evaluation / Principal Problem: Stroke rule out, weakness    Assessment & Plan  Weakness  78 y.o. male with HTN, DM2, and diabetic neuropathy who presented to the ED with L>R shaking, weakness and fatigue since 9/12/24. Patient diagnosed with Covid in the ED.     Work-up:  /88 on arrival.   Covid positive. Influenza/RSV negative.  Labs notable for BUN/Cr 15/1.38, GFR 48.  LFTS WNL  UA negative  CTA H/N w wo:   Negative for acute intracranial abnormality.  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  Atherosclerotic stenosis of bilateral cavernous and supraclinoid intracranial ICAs.  Severe stenosis in the distal left cavernous ICA.  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 Patient able to suppress movements. Additionally patient with intermittent proximal LLE giveway weakness during myoclonic jerks. Patient also with exam findings consistent with known diabetic neuropathy. No additional focal neurologic deficits. Suspect myoclonus to be secondary to infection and JOIE, though would recommend additional work-up to evaluate for any other toxic/metabolic derangements and clarification with patient to ensure no medication changes/additions and that he is taking his Glycolax correctly.   Also of note, patient does have multiple  "occluded/severely stenosis intracranial vessels and reports not being compliant with daily Aspirin. Due to this, along with patient's LLE giveway weakness and his reports of unsteady gait, will also obtain and MRI to rule out stroke. Recommendations as detailed below.    Plan:  Acute ischemic stroke protocol  Load with Aspirin 325mg and Plavix 300mg  Continue Aspirin 81mg and Plavix 75mg daily beginning 9/18  Atorvastatin 40mg  IVF  MRI brain without contrast   Echo   Telemetry  Lipid panel, HbA1C, TSH, folate, B12  Secondary stroke risk factor modification  As symptoms started on 9/12, no need for permissive hypertension however would recommend gradual lowering of BP with a goal today of systolics less than 180s and an eventual goal of normotension.  Avoid hypotension  Goal of normothermia and euglycemia   PT/OT/ST  Stroke Education  Frequent neurological checks  Stat CT head with worsening in neuro exam  Notify neurology with any changes in exam   Medical management as per primary team.  Recommend continue evaluating for toxic/metabolic derangements  Myoclonus  Patient with L>R spontaneous myoclonus on exam in the setting of infection.  No additional toxic metabolic derangements have been identified at this time.    Continue evaluation of toxic/metabolic derangements  Clarify if there has been any recent medication changes, addition of any medications.   Patient does take Glycolax, ensure that he is taking as prescribed  COVID  Current SpO2 is 95% on RA  Respiratory symptoms actually improving per patient  Management and supportive care as per primary team.  JOIE (acute kidney injury) ([facility])  Labs notable for BUN/Cr 15/1.38, GFR 48.  IVF  Management as per primary team  HTN (hypertension)    DM (diabetes mellitus) ([facility] [place])  No results found for: \"HGBA1C\"    No results for input(s): \"POCGLU\" in the last 72 hours.    Blood Sugar Average: Last 72 hrs:          Recommendations for outpatient neurological follow up have yet " "to be determined.    History of Present Illness   HPI: Reyes Lira is a 78 y.o. right handed male with HTN, DM2, and diabetic neuropathy who presented to the ED with L>R shaking, weakness and fatigue ongoing since 9/12/24.  Patient is antiplatelet naïve at baseline    /88 on arrival.   Covid positive. Labs notable for BUN/Cr 15/1.38, GFR 48.  UA negative  CTA H/N w wo:   Negative for acute intracranial abnormality. 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  Atherosclerotic stenosis of bilateral cavernous and supraclinoid intracranial ICAs.  Severe stenosis in the distal left cavernous ICA.  No hemodynamically significant stenosis in bilateral cervical carotid arteries or right vertebral artery    Upon further conversation with the patient and his wife today, he explained that at baseline he ambulates occasionally with a cane and has no issues with confusion. He reports that he was at his baseline until 9/12 when he developed congestion, coughing and PIPER along with shaking. He also notes that when symptoms first began he was hearing the sound of \"static\" in his head, though this has since resolved. The shaking has worsened to the point where he feels less secure with ambulation and on one occasion felt as if his LLE gave out on him causing a near fall. Due to this he has been using his cane all of the time.   In regards to his shaking, patient notes that it gets worse with anxiety provoking situations. Patient is able to completely suppress his shaking for seconds at a time on command. Wife denies seizure like activity including shaking and staring episodes.  Patient denies CP, acute visual changes, speech or swallowing difficulties, dizziness. He does note generalized weakness and " "chronic numbness due to his neuropathy. Patient has chronic urinary incontinence for which he wears a brief. He also explained that last year he was severely constipated requiring Glycolax, which he uses regularly now.  Lastly, patient does acknowledge having worsening hearing over time.     Review of Systems   HENT:  Positive for hearing loss. Negative for trouble swallowing.    Respiratory:  Positive for shortness of breath (PIPER, though this has improved).    Cardiovascular:  Negative for chest pain.   Gastrointestinal:  Negative for constipation (No longer, now that he takes Glycolax).   Genitourinary:         Urinary incontinence requiring a brief   Neurological:  Positive for weakness (generalized, and LLE giveing out on occasion) and numbness (neuropathy). Negative for dizziness, facial asymmetry, speech difficulty and headaches.        Shaking L>R        Objective      Temp:  [97.9 °F (36.6 °C)-98.8 °F (37.1 °C)] 98.8 °F (37.1 °C)  HR:  [54-69] 55  Resp:  [18-20] 20  BP: (133-190)/() 133/101  O2 Device: None (Room air)          No intake/output data recorded.  Lines/Drains/Airways       Active Status       None                  Physical Exam  Constitutional:       General: He is not in acute distress.     Appearance: He is not ill-appearing.   HENT:      Head: Normocephalic and atraumatic.   Eyes:      Extraocular Movements: EOM normal.   Pulmonary:      Effort: Pulmonary effort is normal. No respiratory distress.   Neurological:      Mental Status: He is alert.      Coordination: Finger-Nose-Finger Test and Heel to Shin Test normal.     Neurologic Exam     Mental Status   Normal comprehension.   Alert. Oriented x4, though confused the name of the hospital. Able to state current president, complete monetary calculations, spell WORLD forwards and backwards. Able to follow multistep sequential commands and R/L commands without difficulty. No aphasia or dysarthria. Patient does have a chronic \"speech " "impediment\" resulting in occasional stuttering.      Cranial Nerves     CN II   Visual acuity: normal  Right visual field deficit: none  Left visual field deficit: none     CN III, IV, VI   Extraocular motions are normal.   Nystagmus: none   Conjugate gaze: present    CN V   Facial sensation intact.     CN VII   Facial expression full, symmetric.     CN VIII   Hearing: impaired    CN XI   CN XI normal.     CN XII   Tongue deviation: none    Motor Exam   Muscle bulk: normal  Right arm pronator drift: absent  Left arm pronator drift: absent    Strength   Strength 5/5 except as noted.   Myoclonus evident at rest > action, L side greater than R.   Proximal LLE giveway weakness in the setting of myoclonus.  +mild asterixis L>R     Sensory Exam   Light touch normal.   Right arm vibration: normal  Left arm vibration: normal  Right leg vibration: decreased from knee  Left leg vibration: decreased from knee  Temperature: Diminished distally in BUEs and BLEs symmetrically      Gait, Coordination, and Reflexes     Coordination   Finger to nose coordination: normal  Heel to shin coordination: normal  Diminished reflexes throughout       Lab Results: Reviewed  Imaging Review: CTA H/N  Other Studies: None    VTE Prophylaxis: None currently, patient in the ED      "

## 2024-09-17 NOTE — ED PROVIDER NOTES
1. Acute COVID-19    2. Tremor    3. Weakness      ED Disposition       ED Disposition   Admit    Condition   Stable    Date/Time   Tue Sep 17, 2024 12:30 PM    Comment                  Assessment & Plan       Medical Decision Making  78-year male is coming in with complaints of diffuse body shakes and tremors started a few days ago and gradually worsening.  Patient has had some fatigue and feeling off balance and feels like his left side might be weaker since it shakes more compared to the right.  Patient's wife has had cold flu symptoms and so reports patient might have similar.  He has had some cough and fatigue.  He denies any vision change or speech change and does note that he does have a known stutter.  He denies any chest pain or shortness of breath or abdominal pain.  Denies any focal weakness but states when he is shaking he feels weaker on the left with the shaking and that is making him feel unstable.    Evaluate patient for new tremor and neurologic symptoms and weakness.  Patient does have history hypertension diabetes and is so at risk.  Will also get viral swab for potential viral etiology of neurologic symptoms.  Symptoms have been going on for multiple days so stroke alert not called.  Will get EKG and basic labs and cardiac enzymes.  Will get CTA of the head and neck.  And will discuss with neurology.    Amount and/or Complexity of Data Reviewed  Labs: ordered.  Radiology: ordered and independent interpretation performed.    Risk  OTC drugs.  Prescription drug management.  Decision regarding hospitalization.                 ED Course as of 09/17/24 1647   Tue Sep 17, 2024   1011 Sent message to neuro about pt.       Medications   sodium chloride 0.9 % infusion (75 mL/hr Intravenous Rate/Dose Change 9/17/24 1452)   insulin lispro (HumALOG/ADMELOG) 100 units/mL subcutaneous injection 1-5 Units (has no administration in time range)   heparin (porcine) subcutaneous injection 5,000 Units (has no  "administration in time range)   atorvastatin (LIPITOR) tablet 40 mg (has no administration in time range)   aspirin chewable tablet 81 mg (has no administration in time range)   amLODIPine (NORVASC) tablet 10 mg (has no administration in time range)   losartan (COZAAR) tablet 25 mg (has no administration in time range)   insulin detemir (LEVEMIR) subcutaneous injection 30 Units (has no administration in time range)   acetaminophen (TYLENOL) tablet 650 mg (has no administration in time range)   magnesium sulfate 2 g/50 mL IVPB (premix) 2 g (0 g Intravenous Stopped 9/17/24 1059)   iohexol (OMNIPAQUE) 350 MG/ML injection (MULTI-DOSE) 85 mL (85 mL Intravenous Given 9/17/24 1028)   aspirin tablet 325 mg (has no administration in time range)   clopidogrel (PLAVIX) tablet 300 mg (has no administration in time range)       History of Present Illness         History provided by:  Patient  Neurologic Problem  Location:  All extremities  Quality:  Feels shaking and fatigued, more on left side  Severity:  Moderate  Onset quality:  Gradual  Duration:  6 days  Timing:  Constant  Progression:  Worsening  Chronicity:  New  Context:  Pt reports has been feeling fatigued and having shakes and tremors, gradually worsening for past few days. Worse on the left side. Sometimes he shakes that it is causing him to be off balance and unsteady. Also felt like he had \"static\" in his head. Wife reports some cough and illness and had \"flu\" and pt has had some cough so may be flu related.  Relieved by:  Nothing  Worsened by:  Nothing  Ineffective treatments:  None  Associated symptoms: congestion, cough and fatigue    Associated symptoms: no abdominal pain, no chest pain, no fever, no headaches, no nausea, no rash, no rhinorrhea, no shortness of breath and no vomiting            Review of Systems   Constitutional:  Positive for fatigue. Negative for fever.   HENT:  Positive for congestion. Negative for rhinorrhea.    Respiratory:  Positive for " cough. Negative for shortness of breath.    Cardiovascular:  Negative for chest pain.   Gastrointestinal:  Negative for abdominal pain, nausea and vomiting.   Skin:  Negative for rash.   Neurological:  Negative for headaches.   All other systems reviewed and are negative.          Objective     ED Triage Vitals   Temperature Pulse Blood Pressure Respirations SpO2 Patient Position - Orthostatic VS   09/17/24 0832 09/17/24 0832 09/17/24 0832 09/17/24 0832 09/17/24 0832 09/17/24 1321   97.9 °F (36.6 °C) 69 151/88 18 95 % Lying      Temp Source Heart Rate Source BP Location FiO2 (%) Pain Score    09/17/24 0832 09/17/24 0832 09/17/24 1321 -- 09/17/24 1530    Oral Monitor Right arm  No Pain        Physical Exam  Vitals and nursing note reviewed.   Constitutional:       General: He is not in acute distress.     Appearance: He is well-developed. He is not diaphoretic.   HENT:      Head: Normocephalic and atraumatic.      Nose: Nose normal.   Eyes:      Extraocular Movements: Extraocular movements intact.      Conjunctiva/sclera: Conjunctivae normal.   Cardiovascular:      Rate and Rhythm: Normal rate and regular rhythm.      Heart sounds: Normal heart sounds.   Pulmonary:      Effort: Pulmonary effort is normal. No respiratory distress.      Breath sounds: Normal breath sounds.   Abdominal:      General: There is no distension.      Palpations: Abdomen is soft.      Tenderness: There is no abdominal tenderness.   Musculoskeletal:         General: No deformity. Normal range of motion.      Cervical back: Neck supple.   Skin:     General: Skin is warm and dry.   Neurological:      General: No focal deficit present.      Mental Status: He is alert and oriented to person, place, and time. Mental status is at baseline.      GCS: GCS eye subscore is 4. GCS verbal subscore is 5. GCS motor subscore is 6.      Cranial Nerves: No cranial nerve deficit.      Motor: Tremor present.   Psychiatric:         Mood and Affect: Mood normal.          Labs Reviewed   [facility], [facility]       Result Value    SARS-CoV-2 Positive (*)     INFLUENZA A PCR Negative      INFLUENZA B PCR Negative      RSV PCR Negative      Narrative:     This test has been performed using the CoV-2/Flu/RSV plus assay on the [employer] GeneXpert platform. This test has been validated by the  and verified by the performing laboratory.     This test is designed to amplify and detect the following: nucleocapsid (N), envelope (E), and RNA-dependent RNA polymerase (RdRP) genes of the SARS-CoV-2 genome; matrix (M), basic polymerase (PB2), and acidic protein (PA) segments of the influenza A genome; matrix (M) and non-structural protein (NS) segments of the influenza B genome, and the nucleocapsid genes of RSV A and RSV B.     Positive results are indicative of the presence of Flu A, Flu B, RSV, and/or SARS-CoV-2 RNA. Positive results for SARS-CoV-2 or suspected novel influenza should be reported to state, local, or federal health departments according to local reporting requirements.      All results should be assessed in conjunction with clinical presentation and other laboratory markers for clinical management.     FOR PEDIATRIC PATIENTS - copy/paste COVID Guidelines URL to browser: [email or web address]      CBC AND DIFFERENTIAL - Abnormal    WBC 7.57      RBC 4.36      Hemoglobin 11.8 (*)     Hematocrit 36.9      MCV 85      MCH 27.1      MCHC 32.0      RDW 13.1      MPV 9.4      Platelets 247      nRBC 0      Segmented % 51      Immature Grans % 1      Lymphocytes % 36      Monocytes % 9      Eosinophils Relative 2      Basophils Relative 1      Absolute Neutrophils 3.94      Absolute Immature Grans 0.04      Absolute Lymphocytes 2.70      Absolute Monocytes 0.67      Eosinophils Absolute 0.17      Basophils Absolute 0.05     COMPREHENSIVE METABOLIC PANEL - Abnormal    Sodium 137       Potassium 4.1      Chloride 102      CO2 26      ANION GAP 9      BUN 15      Creatinine 1.38 (*)     Glucose 161 (*)     Calcium 8.6      AST 17      ALT 7      Alkaline Phosphatase 57      Total Protein 7.0      Albumin 3.9      Total Bilirubin 0.47      eGFR 48      Narrative:     National Kid[employer] Disease Foundation guidelines for Chronic Kidney Disease (CKD):     Stage 1 with normal or high GFR (GFR > 90 mL/min/1.73 square meters)    Stage 2 Mild CKD (GFR = 60-89 mL/min/1.73 square meters)    Stage 3A Moderate CKD (GFR = 45-59 mL/min/1.73 square meters)    Stage 3B Moderate CKD (GFR = 30-44 mL/min/1.73 square meters)    Stage 4 Severe CKD (GFR = 15-29 mL/min/1.73 square meters)    Stage 5 End Stage CKD (GFR <15 mL/min/1.73 square meters)  Note: GFR calculation is accurate only with a steady state creatinine   APTT - Abnormal    PTT 20 (*)    UA W REFLEX TO MICROSCOPIC WITH REFLEX TO CULTURE - Abnormal    Color, UA Yellow      Clarity, UA Clear      Specific Gravity, UA 1.021      pH, UA 5.5      Leukocytes, UA Negative      Nitrite, UA Negative      Protein, UA Trace (*)     Glucose, UA Negative      Ketones, UA Negative      Urobilinogen, UA <2.0      Bilirubin, UA Negative      Occult Blood, UA Negative     MAGNESIUM - Normal    Magnesium 1.9     HS TROPONIN I 0HR - Normal    hs TnI 0hr 4     TSH, 3RD GENERATION WITH FREE T4 REFLEX - Normal    TSH 3RD GENERATON 2.085     PROTIME-INR - Normal    Protime 13.7      INR 0.98      Narrative:     INR Therapeutic Range    Indication                                             INR Range      Atrial Fibrillation                                               2.0-3.0  Hypercoagulable State                                    2.0.2.3  Left Ventricular Asist Device                            2.0-3.0  Mechanical Heart Valve                                  -    Aortic(with afib, MI, embolism, HF, LA enlargement,    and/or coagulopathy)                                      2.0-3.0 (2.5-3.5)     Mitral                                                             2.5-3.5  Prosthetic/Bioprosthetic Heart Valve               2.0-3.0  Venous thromboembolism (VTE: VT, PE        2.0-3.0   LACTIC ACID, PLASMA (W/REFLEX IF RESULT > 2.0) - Normal    LACTIC ACID 1.0      Narrative:     Result may be elevated if tourniquet was used during collection.   HS TROPONIN I 2HR - Normal    hs TnI 2hr 4      Delta 2hr hsTnI 0     URINE MICROSCOPIC - Normal    RBC, UA 1-2      WBC, UA 1-2      Epithelial Cells Occasional      Bacteria, UA None Seen     HEMOGLOBIN A1C     CTA head and neck with and without contrast   Final Interpretation by Michelle Enrique MD (09/17 9989)      CT Brain:      1.  No acute intracranial abnormality.   2.  Chronic microangiopathic change.      CT Angiography:      3.  Age-indeterminate occluded left vertebral artery from its origin. There is flow reconstitution at mid/distal V2 segment with multifocal severe stenosis. The V3 and V4 segments are patent with high-grade stenosis at distal V4 segment.   4.  Atherosclerotic stenosis of bilateral cavernous and supraclinoid intracranial ICAs, severe stenosis in the distal left cavernous ICA.   5.  No hemodynamically significant stenosis in bilateral cervical carotid and right vertebral arteries.                  I personally discussed this study with LAUREEN GREENFIELD on 9/17/2024 11:25 AM.                        Workstation performed: [id number]         XR chest 2 views   ED Interpretation by Laureen Greenfield MD (09/17 1009)   NAD      Final Interpretation by Watson Ruiz MD (09/17 1116)      No acute cardiopulmonary disease on this examination, which is somewhat limited secondary to low lung volumes.            Resident: Teto Kennedy I, the attending radiologist, have reviewed the images and agree with the final report above.      Workstation performed: [id number]         MRI Inpatient Order    (Results Pending)        ECG 12 Lead Documentation Only    Date/Time: 9/17/2024 8:50 AM    Performed by: Ambika Greenfield MD  Authorized by: Ambika Greenfield MD    Indications / Diagnosis:  Shakes  ECG reviewed by me, the ED Provider: yes    Patient location:  ED  Rate:     ECG rate:  74    ECG rate assessment: normal    Rhythm:     Rhythm: sinus rhythm    Ectopy:     Ectopy: PVCs    Conduction:     Conduction: abnormal      Abnormal conduction: LAFB    Other findings:     Other findings: LVH           Ambika Greenfield MD  09/17/24 5121

## 2024-09-17 NOTE — ASSESSMENT & PLAN NOTE
Current SpO2 is 95% on RA  Respiratory symptoms actually improving per patient  Management and supportive care as per primary team.

## 2024-09-17 NOTE — H&P
"H&P - Hospitalist   Name: Reyes Lira 78 y.o. male I MRN: [id number]  Unit/Bed#: ED 23 I Date of Admission: 9/17/2024   Date of Service: 9/17/2024 I Hospital Day: 0     Assessment & Plan  Weakness  Patient was found to have weakness in the setting of COVID.  However described increased weakness in the left side compared to the right.    CTH negative. 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 There is flow reconstitution at mid/distal V2 segment with multifocal severe stenosis. The V3 and V4 segments are patent with high-grade stenosis at distal V4 segment. Atherosclerotic stenosis of bilateral cavernous and supraclinoid intracranial ICAs, severe stenosis in the distal left cavernous ICA. No hemodynamically significant stenosis in bilateral cervical carotid and right vertebral arteries.   ED provider discussed with Neurology who recommended stroke rule out and aspirin  Neuro consult -discussed with neurology and they recommending aspirin/Plavix load.  Okay for gradual hypertension with systolic blood pressure goal today of 180, then gradual normotension given symptoms started Thursday  Stroke pathway  A1c, lipids, echo  MRI Brain  Neuro checks   COVID  Patient presenting with weakness, shaking/tremor and fatgiue and found to be COVID-positive  Not requiring oxygen  Wife was recently sick with similar illness  Monitor for fevers, PRN tylenol  JOIE (acute kidney injury) ([facility])    Myoclonus    HTN (hypertension)  Patient takes amlodipine 10 mg daily, losartan 50 mg daily, hydrochlorothiazide 12.5 mg daily at home  Goal is systolics around 180.  He resumed amlodipine and losartan, continue to hold hydrochlorothiazide for now and monitor blood pressure readings  DM (diabetes mellitus) ([facility])  No results found for: \"HGBA1C\"    No results for input(s): \"POCGLU\" in the last 72 hours.    Blood Sugar Average: Last 72 hrs:    History of diabetes reported  Patient reported that he takes " "insulin detemir 44 units in the morning and 50 units at night.  However per documentation and last office visit he was only recorded that he takes 44 units daily.  He did report he took morning determir insulin already today.   Will order 30 u BID for now and monitor for hypoglycemia   Sliding scale is also ordered     VTE Pharmacologic Prophylaxis:   Moderate Risk (Score 3-4) - Pharmacological DVT Prophylaxis Ordered: heparin.  Code Status: Level 1 - Full Code dw pt  Discussion with family: Updated  (wife) at bedside.    Anticipated Length of Stay: Patient will be admitted on an inpatient basis with an anticipated length of stay of greater than 2 midnights secondary to COVID, stroke r/o.    History of Present Illness   Chief Complaint: Tremors    Reyes Lira is a 78 y.o. male with a PMH of hypertension, diabetes who presents with worsening tremors bilaterally in the setting of upper respiratory infection.  He reported that his wife was recently sick at home with \"the flu.\" He reported since last Thursday he has had worsening bilateral upper extremity tremors and left-sided weakness in his upper and lower extremity. Denied any night sweats, fevers, chills, heat/cold intolerance. No sob or chest pain.  No previous history of stroke or MI. Currently denies any acute complaints but does have apparent tremor which he reported was new. Wife present.     Review of Systems  10 pt review of systems reviewed with patient and pertinent positive/negatives as per HPI.      I have reviewed the patient's PMH, PSH, Social History, Family History, Meds, and Allergies  Social History:  Marital Status: /Civil Union   Occupation: none  Patient Pre-hospital Living Situation: Home  Patient Pre-hospital Level of Mobility: walks with cane  Patient Pre-hospital Diet Restrictions: none    Objective     Vitals:   Blood Pressure: (!) 190/90 (09/17/24 1321)  Pulse: 69 (09/17/24 1321)  Temperature: 97.9 °F (36.6 °C) " "(09/17/24 0832)  Temp Source: Oral (09/17/24 0832)  Respirations: 20 (09/17/24 1321)  Weight - Scale: 100 kg (220 lb 9.6 oz) (09/17/24 1423)  SpO2: 95 % (09/17/24 1321)    Physical Exam  NAD  Nonlabored resp  RRR  nTND abd   Equal upper and lower ext bilaterally  Tongue midline protrusion, no facial symmetry  aaox3  Lines/Drains:  Lines/Drains/Airways       Active Status       None                        Additional Data:   Lab Results: I have reviewed the following results: CBC/BMP:   .     09/17/24  0928   WBC 7.57   HGB 11.8*   HCT 36.9      SODIUM 137   K 4.1      CO2 26   BUN 15   CREATININE 1.38*   GLUC 161*   MG 1.9    , Creatinine Clearance: Estimated Creatinine Clearance: 53.2 mL/min (A) (by C-G formula based on SCr of 1.38 mg/dL (H))., Troponin,BNP:  .     09/17/24  0928 09/17/24  1149   HSTNI0 4  --    HSTNI2  --  4      Results from last 7 days   Lab Units 09/17/24  0928   WBC Thousand/uL 7.57   HEMOGLOBIN g/dL 11.8*   HEMATOCRIT % 36.9   PLATELETS Thousands/uL 247   SEGS PCT % 51   LYMPHO PCT % 36   MONO PCT % 9   EOS PCT % 2     Results from last 7 days   Lab Units 09/17/24  0928   SODIUM mmol/L 137   POTASSIUM mmol/L 4.1   CHLORIDE mmol/L 102   CO2 mmol/L 26   BUN mg/dL 15   CREATININE mg/dL 1.38*   ANION GAP mmol/L 9   CALCIUM mg/dL 8.6   ALBUMIN g/dL 3.9   TOTAL BILIRUBIN mg/dL 0.47   ALK PHOS U/L 57   ALT U/L 7   AST U/L 17   GLUCOSE RANDOM mg/dL 161*     Results from last 7 days   Lab Units 09/17/24  0928   INR  0.98         No results found for: \"HGBA1C\"  Results from last 7 days   Lab Units 09/17/24  0928   LACTIC ACID mmol/L 1.0       Imaging Review: Reviewed radiology reports from this admission including: CT head.  Other Studies: EKG was reviewed.     Administrative Statements   I have spent a total time of 40+ minutes in caring for this patient on the day of the visit/encounter including Diagnostic results, Risks and benefits of tx options, and Instructions for management.    ** " Please Note: This note has been constructed using a voice recognition system. **

## 2024-09-17 NOTE — ASSESSMENT & PLAN NOTE
Patient was found to have weakness in the setting of COVID.  However described increased weakness in the left side compared to the right.    CTH negative. 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 There is flow reconstitution at mid/distal V2 segment with multifocal severe stenosis. The V3 and V4 segments are patent with high-grade stenosis at distal V4 segment. Atherosclerotic stenosis of bilateral cavernous and supraclinoid intracranial ICAs, severe stenosis in the distal left cavernous ICA. No hemodynamically significant stenosis in bilateral cervical carotid and right vertebral arteries.   ED provider discussed with Neurology who recommended stroke rule out and aspirin  Neuro consult -discussed with neurology and they recommending aspirin/Plavix load.  Okay for gradual hypertension with systolic blood pressure goal today of 180, then gradual normotension given symptoms started Thursday  Stroke pathway  A1c, lipids, echo  MRI Brain  Neuro checks

## 2024-09-17 NOTE — ASSESSMENT & PLAN NOTE
Patient takes amlodipine 10 mg daily, losartan 50 mg daily, hydrochlorothiazide 12.5 mg daily at home  Goal is systolics around 180.  He resumed amlodipine and losartan, continue to hold hydrochlorothiazide for now and monitor blood pressure readings

## 2024-09-18 ENCOUNTER — TELEPHONE (OUTPATIENT)
Age: 79
End: 2024-09-18

## 2024-09-18 ENCOUNTER — APPOINTMENT (INPATIENT)
Dept: MRI IMAGING | Facility: HOSPITAL | Age: 79
DRG: 178 | End: 2024-09-18
Payer: COMMERCIAL

## 2024-09-18 LAB
GLUCOSE SERPL-MCNC: 127 MG/DL (ref 65–140)
GLUCOSE SERPL-MCNC: 220 MG/DL (ref 65–140)
GLUCOSE SERPL-MCNC: 86 MG/DL (ref 65–140)

## 2024-09-18 PROCEDURE — 97163 PT EVAL HIGH COMPLEX 45 MIN: CPT

## 2024-09-18 PROCEDURE — 70551 MRI BRAIN STEM W/O DYE: CPT

## 2024-09-18 PROCEDURE — 99232 SBSQ HOSP IP/OBS MODERATE 35: CPT | Performed by: STUDENT IN AN ORGANIZED HEALTH CARE EDUCATION/TRAINING PROGRAM

## 2024-09-18 PROCEDURE — 82948 REAGENT STRIP/BLOOD GLUCOSE: CPT

## 2024-09-18 PROCEDURE — 97167 OT EVAL HIGH COMPLEX 60 MIN: CPT

## 2024-09-18 RX ORDER — SODIUM CHLORIDE 9 MG/ML
125 INJECTION, SOLUTION INTRAVENOUS CONTINUOUS
Status: DISCONTINUED | OUTPATIENT
Start: 2024-09-18 | End: 2024-09-18

## 2024-09-18 RX ADMIN — AMLODIPINE BESYLATE 10 MG: 10 TABLET ORAL at 11:08

## 2024-09-18 RX ADMIN — ATORVASTATIN CALCIUM 40 MG: 40 TABLET, FILM COATED ORAL at 17:55

## 2024-09-18 RX ADMIN — HEPARIN SODIUM 5000 UNITS: 5000 INJECTION INTRAVENOUS; SUBCUTANEOUS at 20:59

## 2024-09-18 RX ADMIN — LOSARTAN POTASSIUM 25 MG: 25 TABLET, FILM COATED ORAL at 11:08

## 2024-09-18 RX ADMIN — HEPARIN SODIUM 5000 UNITS: 5000 INJECTION INTRAVENOUS; SUBCUTANEOUS at 06:51

## 2024-09-18 RX ADMIN — ASPIRIN 81 MG: 81 TABLET, CHEWABLE ORAL at 11:08

## 2024-09-18 RX ADMIN — HEPARIN SODIUM 5000 UNITS: 5000 INJECTION INTRAVENOUS; SUBCUTANEOUS at 15:00

## 2024-09-18 RX ADMIN — INSULIN DETEMIR 15 UNITS: 100 INJECTION, SOLUTION SUBCUTANEOUS at 20:59

## 2024-09-18 RX ADMIN — INSULIN DETEMIR 30 UNITS: 100 INJECTION, SOLUTION SUBCUTANEOUS at 11:20

## 2024-09-18 RX ADMIN — INSULIN LISPRO 1 UNITS: 100 INJECTION, SOLUTION INTRAVENOUS; SUBCUTANEOUS at 17:00

## 2024-09-18 NOTE — PLAN OF CARE
Problem: PHYSICAL THERAPY ADULT  Goal: Performs mobility at highest level of function for planned discharge setting.  See evaluation for individualized goals.  Description: Treatment/Interventions: LE strengthening/ROM, Functional transfer training, Therapeutic exercise, Endurance training, Patient/family training, Equipment eval/education, Bed mobility, Gait training, Elevations, Spoke to nursing, OT          See flowsheet documentation for full assessment, interventions and recommendations.  9/18/2024 1142 by Virginia Key PT  Note: Prognosis: Good  Problem List: Decreased strength, Decreased endurance, Impaired balance, Decreased mobility, Decreased coordination  Assessment: Pt is 78 y.o. male seen for PT evaluation on 9/18/2024 s/p admit to [facility] on 9/17/2024 w/ Weakness. PT was consulted to assess pt's functional mobility and d/c needs. Order placed for PT eval and tx. PTA, pt resides with wife in 1SH with 3-4 CECIL, ambulates with SPC prn, + fall history. At time of eval, pt requiring min ax2 for transfers and short household distance gait trial with use of RW, min assist for bed mobility. Upon evaluation, pt presenting with impaired functional mobility d/t decreased strength, decreased endurance, impaired balance, decreased mobility, decreased coordination, and activity intolerance. Pertinent PMHx and current co-morbidities affecting pt's physical performance at time of assessment include: weakness, COVID, JOIE, myoclonus, HTN, DM. Personal factors affecting pt at time of eval include: ambulating w/ assistive device, inability to ambulate household distances, limited home support, positive fall history, and decreased initiation and engagement. The following objective measures performed on IE also reveal limitations: Barthel Index: 50/100, Modified Kaushik: 4 (moderate/severe disability), and AM-PAC 6-Clicks: 14/24. Pt's clinical presentation is currently unstable/unpredictable seen in pt's  presentation of advanced age, abnormal lab value(s), and ongoing medical assessment. Overall, pt's rehab potential and prognosis to return to PLOF is good as impacted by objective findings, warranting pt to receive further skilled PT interventions to address identified impairments, activity limitation(s), and participation restriction(s). Pt to benefit from continued PT tx to address deficits as defined above and maximize level of functional independent mobility. From PT/mobility standpoint, recommend level 1, maximum resource intensity in order to facilitate return to PLOF.  Barriers to Discharge: Inaccessible home environment, Decreased caregiver support     Rehab Resource Intensity Level, PT: I (Maximum Resource Intensity)    See flowsheet documentation for full assessment.     9/18/2024 1142 by Virginia Key PT  Note: Prognosis: Good  Problem List: Decreased strength, Decreased endurance, Impaired balance, Decreased mobility, Decreased coordination  Assessment: Pt is 78 y.o. male seen for PT evaluation on 9/18/2024 s/p admit to [facility] on 9/17/2024 w/ Weakness. PT was consulted to assess pt's functional mobility and d/c needs. Order placed for PT eval and tx. PTA, pt resides with wife in 1SH with 3-4 CECIL, ambulates with SPC prn, + fall history. At time of eval, pt requiring min ax2 for transfers and short household distance gait trial with use of RW, min assist for bed mobility. Upon evaluation, pt presenting with impaired functional mobility d/t decreased strength, decreased endurance, impaired balance, decreased mobility, decreased coordination, and activity intolerance. Pertinent PMHx and current co-morbidities affecting pt's physical performance at time of assessment include: weakness, COVID, JOIE, myoclonus, HTN, DM. Personal factors affecting pt at time of eval include: ambulating w/ assistive device, inability to ambulate household distances, limited home support, positive fall history, and  decreased initiation and engagement. The following objective measures performed on IE also reveal limitations: Barthel Index: 50/100, Modified [place]: 4 (moderate/severe disability), and AM-PAC 6-Clicks: 14/24. Pt's clinical presentation is currently unstable/unpredictable seen in pt's presentation of advanced age, abnormal lab value(s), and ongoing medical assessment. Overall, pt's rehab potential and prognosis to return to PLOF is good as impacted by objective findings, warranting pt to receive further skilled PT interventions to address identified impairments, activity limitation(s), and participation restriction(s). Pt to benefit from continued PT tx to address deficits as defined above and maximize level of functional independent mobility. From PT/mobility standpoint, recommend level 1, maximum resource intensity in order to facilitate return to PLOF.  Barriers to Discharge: Inaccessible home environment, Decreased caregiver support     Rehab Resource Intensity Level, PT: I (Maximum Resource Intensity)    See flowsheet documentation for full assessment.

## 2024-09-18 NOTE — PLAN OF CARE
Problem: OCCUPATIONAL THERAPY ADULT  Goal: Performs self-care activities at highest level of function for planned discharge setting.  See evaluation for individualized goals.  Description: Treatment Interventions: ADL retraining, Functional transfer training, Endurance training, Patient/family training          See flowsheet documentation for full assessment, interventions and recommendations.   Outcome: Progressing  Note: Limitation: Decreased ADL status, Decreased endurance, Decreased high-level ADLs     Assessment: Pt is a 78 y.o. male seen for OT evaluation s/p admit to [facility] on 9/17/2024 w/ Weakness. Pt found to be COVID+. Comorbidities affecting pt's functional performance at time of assessment include: DM, HTN, obesity, previous surgery, and myoclonus . Personal factors affecting pt at time of IE include:steps to enter environment, difficulty performing ADLS, difficulty performing IADLS , and health management . Prior to admission, pt was independent with all ADLs and functional mobility with intermittent use of SPC. Upon evaluation: Pt requires Minimal Assistance x2 with RW for mobility, and Minimal Assistance to Moderate Assistance for ADLs 2* the following deficits impacting occupational performance: decreased strength, decreased balance, decreased tolerance, and myoclonic jerking motions . Pt to benefit from continued skilled OT tx while in the hospital to address deficits as defined above and maximize level of functional independence w ADL's and functional mobility. Occupational Performance areas to address include: grooming, bathing/shower, toilet hygiene, dressing, and functional mobility. From OT standpoint, recommendation at time of d/c would be Level 1 Maximum Resource Intensity.     Rehab Resource Intensity Level, OT: I (Maximum Resource Intensity)        ESTEFANY Deluca/L

## 2024-09-18 NOTE — TELEPHONE ENCOUNTER
STILL ADMITTED:ED to Hosp-Admission  Current  9/17/2024 - present (1 day)  [facility]          HFU/ [facility]ANCES/ [place]     DC-     ----- Message from Etta Farias PA-C sent at 9/18/2024 11:59 AM EDT -----  Regarding: [facility] Lira will need follow-up in  8 weeks  with general neurology team for Other in 60 minute appointment. They will not require outpatient neurological testing.

## 2024-09-18 NOTE — ASSESSMENT & PLAN NOTE
Patient presenting with weakness, shaking/tremor and fatgiue and found to be COVID-positive  Patient reports not feeling well overall for last few days prior to presentation to the emergency room.  O2 saturation 95 to 96% room air.  Continue with supportive care.  Wife was recently sick with similar illness  Monitor for fevers, PRN tylenol

## 2024-09-18 NOTE — PHYSICAL THERAPY NOTE
"Physical Therapy Evaluation     Patient's Name: Reyes Lira    Admitting Diagnosis  Shakes [R25.1]  Tremor [R25.1]  Weakness [R53.1]  Flu-like symptoms [R68.89]  Acute COVID-19 [U07.1]    Problem List  Patient Active Problem List   Diagnosis    COVID    Weakness    JOIE (acute kidney injury) (HCC)    Myoclonus    HTN (hypertension)    DM (diabetes mellitus) ([facility])       Past Medical History  Past Medical History:   Diagnosis Date    Diabetes mellitus (HCC)     Hypertension        Past Surgical History  History reviewed. No pertinent surgical history.       09/18/24 0849   PT Last Visit   PT Visit Date 09/18/24   Note Type   Note type Evaluation   Pain Assessment   Pain Assessment Tool 0-10   Pain Score No Pain   Restrictions/Precautions   Weight Bearing Precautions Per Order No   Other Precautions Contact/isolation;Airborne/isolation;Fall Risk;Telemetry;Chair Alarm;Bed Alarm  (+COVID19)   Home Living   Type of Home House   Home Layout One level;Performs ADLs on one level;Able to live on main level with bedroom/bathroom;Stairs to enter with rails  (3-4 CECIL front door)   Bathroom Shower/Tub Tub/shower unit   Bathroom Toilet Standard   Bathroom Equipment Shower chair;Grab bars around toilet;Grab bars in shower   Bathroom Accessibility Accessible   Home Equipment Cane;Walker;Wheelchair-manual  (cane used prn/ longer distances/ basement stairs)   Prior Function   Level of [place] Independent with ADLs;Independent with functional mobility;Needs assistance with IADLS   Lives With Spouse   Receives Help From Family   IADLs Family/Friend/Other provides transportation;Family/Friend/Other provides meals;Independent with medication management   Falls in the last 6 months 1 to 4  (1 \"stumble, half fall to the ground\")   Vocational Retired   General   Family/Caregiver Present No   Cognition   Overall Cognitive Status WFL   Arousal/Participation Alert   Orientation Level Oriented X4   Memory Within functional limits "   Following Commands Follows all commands and directions without difficulty   Comments pt agreeable to PT evaluation   RLE Assessment   RLE Assessment   (hip flexion, knee extension 4/5)   LLE Assessment   LLE Assessment   (hip flexion, knee extension 4/5)   Coordination   Movements are Fluid and Coordinated 0   Coordination and Movement Description extremity jerkiness/ tremulous movements. L > R, truncal too. worse at rest   Sensation WFL   Finger to Nose & Finger to Finger  Impaired   Heel to [place] Impaired  (L > R)   Light Touch   RLE Light Touch Grossly intact   LLE Light Touch Grossly intact   Bed Mobility   Supine to Sit 4  Minimal assistance   Additional items Assist x 1;HOB elevated;Bedrails;Increased time required;Verbal cues   Transfers   Sit to Stand 4  Minimal assistance   Additional items Increased time required;Verbal cues;Armrests;Assist x 2   Stand to Sit 4  Minimal assistance   Additional items Increased time required;Verbal cues;Armrests;Assist x 2   Ambulation/Elevation   Gait pattern Decreased foot clearance;Inconsistent ti;Short stride;Step to;Excessively slow  (unsteady)   Gait Assistance 4  Minimal assist   Additional items Assist x 2;Verbal cues;Tactile cues   Assistive Device Rolling walker   Distance 5' from EOB to recliner   Balance   Static Sitting Fair +   Dynamic Sitting Fair   Static Standing Fair -   Dynamic Standing Poor +   Ambulatory Poor +   Endurance Deficit   Endurance Deficit Yes   Activity Tolerance   Activity Tolerance Patient limited by fatigue   Medical Staff Made Aware Pt seen as a co-eval with OT due to the patient's co-morbidities, clinically unstable presentation, and present impairments which are a regression from the patient's baseline.   Nurse Made Aware ROSALVA Bhatti   Assessment   Prognosis Good   Problem List Decreased strength;Decreased endurance;Impaired balance;Decreased mobility;Decreased coordination   Assessment Pt is 78 y.o. male seen for PT evaluation on  9/18/2024 s/p admit to [facility] on 9/17/2024 w/ Weakness. PT was consulted to assess pt's functional mobility and d/c needs. Order placed for PT eval and tx. PTA, pt resides with wife in 1SH with 3-4 CECIL, ambulates with SPC prn, + fall history. At time of eval, pt requiring min ax2 for transfers and short household distance gait trial with use of RW, min assist for bed mobility. Upon evaluation, pt presenting with impaired functional mobility d/t decreased strength, decreased endurance, impaired balance, decreased mobility, decreased coordination, and activity intolerance. Pertinent PMHx and current co-morbidities affecting pt's physical performance at time of assessment include: weakness, COVID, JOIE, myoclonus, HTN, DM. Personal factors affecting pt at time of eval include: ambulating w/ assistive device, inability to ambulate household distances, limited home support, positive fall history, and decreased initiation and engagement. The following objective measures performed on IE also reveal limitations: Barthel Index: 50/100, Modified [place]: 4 (moderate/severe disability), and AM-PAC 6-Clicks: 14/24. Pt's clinical presentation is currently unstable/unpredictable seen in pt's presentation of advanced age, abnormal lab value(s), and ongoing medical assessment. Overall, pt's rehab potential and prognosis to return to PLOF is good as impacted by objective findings, warranting pt to receive further skilled PT interventions to address identified impairments, activity limitation(s), and participation restriction(s). Pt to benefit from continued PT tx to address deficits as defined above and maximize level of functional independent mobility. From PT/mobility standpoint, recommend level 1, maximum resource intensity in order to facilitate return to PLOF.   Barriers to Discharge Inaccessible home environment;Decreased caregiver support   Goals   Patient Goals to go home   STG Expiration Date 09/28/24   Short Term  Goal #1 In 7-10 days: Increase bilateral LE strength 1/2 grade to facilitate independent mobility, Perform all bed mobility tasks modified independent to decrease caregiver burden, Perform all transfers modified independent to improve independence, Ambulate > 150 ft. with least restrictive assistive device modified independent w/o LOB and w/ normalized gait pattern 100% of the time, Navigate 4 stairs modified independent with unilateral handrail to facilitate return to previous living environment, Increase all balance 1/2 grade to decrease risk for falls, Improve Barthel Index score to 65 or greater to facilitate independence, and PT provider will perform functional balance assessment to determine fall risk   PT Treatment Day 0   Plan   Treatment/Interventions LE strengthening/ROM;Functional transfer training;Therapeutic exercise;Endurance training;Patient/family training;Equipment eval/education;Bed mobility;Gait training;Elevations;Spoke to nursing;OT   PT Frequency 3-5x/wk   Discharge Recommendation   Rehab Resource Intensity Level, PT I (Maximum Resource Intensity)   AM-PAC Basic Mobility Inpatient   Turning in Flat Bed Without Bedrails 3   Lying on Back to Sitting on Edge of Flat Bed Without Bedrails 3   Moving Bed to Chair 2   Standing Up From Chair Using Arms 2   Walk in Room 2   Climb 3-5 Stairs With Railing 2   Basic Mobility Inpatient Raw Score 14   Basic Mobility Standardized Score 35.55   [facility] Highest Level Of Mobility   -[facility] Goal 4: Move to chair/commode   -[facility] Achieved 5: Stand (1 or more minutes)   Modified Kaushik Scale   Modified Kaushik Scale 4   Barthel Index   Feeding 10   Bathing 0   Grooming Score 0   Dressing Score 5   Bladder Score 10   Bowels Score 10   Toilet Use Score 5   Transfers (Bed/Chair) Score 10   Mobility (Level Surface) Score 0   Stairs Score 0   Barthel Index Score 50         Virginia Key, PT

## 2024-09-18 NOTE — ASSESSMENT & PLAN NOTE
78 y.o. male with HTN, DM2, and diabetic neuropathy who presented to the ED with L>R shaking, weakness and fatigue since 9/12/24. Patient diagnosed with Covid in the ED.     Work-up:  /88 on arrival.   Covid positive. Influenza/RSV negative.  Labs notable for BUN/Cr 15/1.38, GFR 48.  LFTS WNL  UA negative  CTA H/N w wo:   Negative for acute intracranial abnormality.  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  Atherosclerotic stenosis of bilateral cavernous and supraclinoid intracranial ICAs.  Severe stenosis in the distal left cavernous ICA.  No hemodynamically significant stenosis in bilateral cervical carotid arteries or right vertebral artery  MRI brain without contrast:   No recent infarct, acute intracranial hemorrhage or mass effect.   Chronic microangiopathic changes and remote lacunar infarcts.   Findings suggestive of normal pressure hydrocephalus in the correct clinical setting. Clinical correlation advised.  Loss of flow void within the left intradural vertebral artery which may be due to slow flow given recent CTA head and neck demonstrating high-grade stenosis distally.    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 Patient reports symptoms are much improved compared to yesterday and he feels he is doing much better. Suspect myoclonus to be secondary to infection and JOIE.    Plan:  MRI brain completed overnight and is negative for acute ischemia. Can discontinue stroke pathway.   S/p loading doses of Aspirin 325 mg and Plavix 300 mg on 9/17/24.  Recommend continue on home dose of ASA 81 mg QD. Can discontinue Plavix given MRI brain negative for ischemia.   Atorvastatin 40 mg QPM  Echocardiogram reviewed, EF 55%, no diagnostic regional wall motion abnormality  identified.  Telemetry  Lipid panel, HbA1C, TSH, folate, B12 pending.  Secondary stroke risk factor modification  Goal normotension.  Avoid hypotension  Goal of normothermia and euglycemia   PT/OT/ST  Stroke Education  Frequent neurological checks  Stat CT head with worsening in neuro exam  Notify neurology with any changes in exam   Medical management as per primary team.  Recommend continue evaluating for toxic/metabolic derangements

## 2024-09-18 NOTE — ASSESSMENT & PLAN NOTE
Patient was found to have weakness in the setting of COVID.  However described increased weakness in the left side compared to the right.    CTH negative. 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 There is flow reconstitution at mid/distal V2 segment with multifocal severe stenosis. The V3 and V4 segments are patent with high-grade stenosis at distal V4 segment. Atherosclerotic stenosis of bilateral cavernous and supraclinoid intracranial ICAs, severe stenosis in the distal left cavernous ICA. No hemodynamically significant stenosis in bilateral cervical carotid and right vertebral arteries.   Brain MRI report noted negative for acute infarct within changes remote lacunar infarct.    Currently patient is report overall feeling much better with his symptoms of myoclonus and tremors.  Denies any other new acute complaints.  2D echo pending.  Continue with aspirin, statin  Follow-up with PT OT josy.  Continue supportive care.

## 2024-09-18 NOTE — ASSESSMENT & PLAN NOTE
"No results found for: \"HGBA1C\"    Recent Labs     09/17/24  1700 09/17/24  2140 09/18/24  0811 09/18/24  1133   POCGLU 103 141* 86 127       Blood Sugar Average: Last 72 hrs:  (P) 110    Above case and plan discussed with attending neurologist, Dr. Matt Linton. Please see attestation for additional details/recommendations.  "

## 2024-09-18 NOTE — PLAN OF CARE
Problem: Potential for Falls  Goal: Patient will remain free of falls  Description: INTERVENTIONS:  - Educate patient/family on patient safety including physical limitations  - Instruct patient to call for assistance with activity   - Consult OT/PT to assist with strengthening/mobility   - Keep Call bell within reach  - Keep bed low and locked with side rails adjusted as appropriate  - Keep care items and personal belongings within reach  - Initiate and maintain comfort rounds  - Make Fall Risk Sign visible to staff  - Offer Toileting every    Hours, in advance of need  - Initiate/Maintain   alarm  - Obtain necessary fall risk management equipment:   - Apply yellow socks and bracelet for high fall risk patients  - Consider moving patient to room near nurses station  Outcome: Progressing     Problem: Prexisting or High Potential for Compromised Skin Integrity  Goal: Skin integrity is maintained or improved  Description: INTERVENTIONS:  - Identify patients at risk for skin breakdown  - Assess and monitor skin integrity  - Assess and monitor nutrition and hydration status  - Monitor labs   - Assess for incontinence   - Turn and reposition patient  - Assist with mobility/ambulation  - Relieve pressure over bony prominences  - Avoid friction and shearing  - Provide appropriate hygiene as needed including keeping skin clean and dry  - Evaluate need for skin moisturizer/barrier cream  - Collaborate with interdisciplinary team   - Patient/family teaching  - Consider wound care consult   Outcome: Progressing     Problem: Neurological Deficit  Goal: Neurological status is stable or improving  Description: Interventions:  - Monitor and assess patient's level of consciousness, motor function, sensory function, and level of assistance needed for ADLs.   - Monitor and report changes from baseline. Collaborate with interdisciplinary team to initiate plan and implement interventions as ordered.   - Provide and maintain a safe  environment.  - Consider seizure precautions.  - Consider fall precautions.  - Consider aspiration precautions.  - Consider bleeding precautions.  Outcome: Progressing     Problem: Activity Intolerance/Impaired Mobility  Goal: Mobility/activity is maintained at optimum level for patient  Description: Interventions:  - Assess and monitor patient  barriers to mobility and need for assistive/adaptive devices.  - Assess patient's emotional response to limitations.  - Collaborate with interdisciplinary team and initiate plans and interventions as ordered.  - Encourage independent activity per ability.  - Maintain proper body alignment.  - Perform active/passive rom as tolerated/ordered.  - Plan activities to conserve energy.  - Turn patient as appropriate  Outcome: Progressing     Problem: Communication Impairment  Goal: Ability to express needs and understand communication  Description: Assess patient's communication skills and ability to understand information.  Patient will demonstrate use of effective communication techniques, alternative methods of communication and understanding even if not able to speak.     - Encourage communication and provide alternate methods of communication as needed.  - Collaborate with case management/ for discharge needs.  - Include patient/family/caregiver in decisions related to communication.  Outcome: Progressing     Problem: Potential for Aspiration  Goal: Non-ventilated patient's risk of aspiration is minimized  Description: Assess and monitor vital signs, respiratory status, and labs (WBC).  Monitor for signs of aspiration (tachypnea, cough, rales, wheezing, cyanosis, fever).    - Assess and monitor patient's ability to swallow.  - Place patient up in chair to eat if possible.  - HOB up at 90 degrees to eat if unable to get patient up into chair.  - Supervise patient during oral intake.   - Instruct patient/ family to take small bites.  - Instruct patient/ family to  take small single sips when taking liquids.  - Follow patient-specific strategies generated by speech pathologist.  Outcome: Progressing  Goal: Ventilated patient's risk of aspiration is minimized  Description: Assess and monitor vital signs, respiratory status, airway cuff pressure, and labs (WBC).  Monitor for signs of aspiration (tachypnea, cough, rales, wheezing, cyanosis, fever).    - Elevate head of bed 30 degrees if patient has tube feeding.  - Monitor tube feeding.  Outcome: Progressing     Problem: Nutrition  Goal: Nutrition/Hydration status is improving  Description: Monitor and assess patient's nutrition/hydration status for malnutrition (ex- brittle hair, bruises, dry skin, pale skin and conjunctiva, muscle wasting, smooth red tongue, and disorientation). Collaborate with interdisciplinary team and initiate plan and interventions as ordered.  Monitor patient's weight and dietary intake as ordered or per policy. Utilize nutrition screening tool and intervene per policy. Determine patient's food preferences and provide high-protein, high-caloric foods as appropriate.     - Assist patient with eating.  - Allow adequate time for meals.  - Encourage patient to take dietary supplement as ordered.  - Collaborate with clinical nutritionist.  - Include patient/family/caregiver in decisions related to nutrition.  Outcome: Progressing     Problem: Knowledge Deficit  Goal: Patient/family/caregiver demonstrates understanding of disease process, treatment plan, medications, and discharge instructions  Description: Complete learning assessment and assess knowledge base.  Interventions:  - Provide teaching at level of understanding  - Provide teaching via preferred learning methods  Outcome: Progressing     Problem: NEUROSENSORY - ADULT  Goal: Achieves stable or improved neurological status  Description: INTERVENTIONS  - Monitor and report changes in neurological status  - Monitor vital signs such as temperature, blood  pressure, glucose, and any other labs ordered   - Initiate measures to prevent increased intracranial pressure  - Monitor for seizure activity and implement precautions if appropriate      Outcome: Progressing  Goal: Remains free of injury related to seizures activity  Description: INTERVENTIONS  - Maintain airway, patient safety  and administer oxygen as ordered  - Monitor patient for seizure activity, document and report duration and description of seizure to physician/advanced practitioner  - If seizure occurs,  ensure patient safety during seizure  - Reorient patient post seizure  - Seizure pads on all 4 side rails  - Instruct patient/family to notify RN of any seizure activity including if an aura is experienced  - Instruct patient/family to call for assistance with activity based on nursing assessment  - Administer anti-seizure medications if ordered    Outcome: Progressing  Goal: Achieves maximal functionality and self care  Description: INTERVENTIONS  - Monitor swallowing and airway patency with patient fatigue and changes in neurological status  - Encourage and assist patient to increase activity and self care.   - Encourage visually impaired, hearing impaired and aphasic patients to use assistive/communication devices  Outcome: Progressing

## 2024-09-18 NOTE — PROGRESS NOTES
Progress Note - Neurology   Name: Reyes Lira 78 y.o. male I MRN: [id number]  Unit/Bed#: 2 E 279-01 I Date of Admission: 9/17/2024   Date of Service: 9/18/2024 I Hospital Day: 1     Assessment & Plan  Weakness  78 y.o. male with HTN, DM2, and diabetic neuropathy who presented to the ED with L>R shaking, weakness and fatigue since 9/12/24. Patient diagnosed with Covid in the ED.     Work-up:  /88 on arrival.   Covid positive. Influenza/RSV negative.  Labs notable for BUN/Cr 15/1.38, GFR 48.  LFTS WNL  UA negative  CTA H/N w wo:   Negative for acute intracranial abnormality.  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  Atherosclerotic stenosis of bilateral cavernous and supraclinoid intracranial ICAs.  Severe stenosis in the distal left cavernous ICA.  No hemodynamically significant stenosis in bilateral cervical carotid arteries or right vertebral artery  MRI brain without contrast:   No recent infarct, acute intracranial hemorrhage or mass effect.   Chronic microangiopathic changes and remote lacunar infarcts.   Findings suggestive of normal pressure hydrocephalus in the correct clinical setting. Clinical correlation advised.  Loss of flow void within the left intradural vertebral artery which may be due to slow flow given recent CTA head and neck demonstrating high-grade stenosis distally.    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 Patient reports symptoms are much improved compared to yesterday and he feels he is doing much better. Suspect myoclonus to be secondary to infection and JOIE.    Plan:  MRI brain completed overnight and is negative for acute ischemia. Can discontinue stroke pathway.   S/p loading doses of Aspirin 325 mg and Plavix 300 mg on 9/17/24.  Recommend  "continue on home dose of ASA 81 mg QD. Can discontinue Plavix given MRI brain negative for ischemia.   Atorvastatin 40 mg QPM  Echocardiogram reviewed, EF 55%, no diagnostic regional wall motion abnormality identified.  Telemetry  Lipid panel, HbA1C, TSH, folate, B12 pending.  Secondary stroke risk factor modification  Goal normotension.  Avoid hypotension  Goal of normothermia and euglycemia   PT/OT/ST  Stroke Education  Frequent neurological checks  Stat CT head with worsening in neuro exam  Notify neurology with any changes in exam   Medical management as per primary team.  Recommend continue evaluating for toxic/metabolic derangements  Myoclonus  Patient with L>R spontaneous myoclonus on exam in the setting of infection.  No additional toxic metabolic derangements have been identified at this time.    Continue evaluation of toxic/metabolic derangements  Clarify if there has been any recent medication changes, addition of any medications.   Patient does take Glycolax, patient reports he is taking this as prescribed but does report that he missed one dose this week due to feeling unwell.  COVID  Current SpO2 is 95% on RA  Respiratory symptoms actually improving per patient  Management and supportive care as per primary team.  JOIE (acute kidney injury) ([facility])  Labs notable for BUN/Cr 15/1.38, GFR 48.  IVF  Management as per primary team  HTN (hypertension)  Goal normotension.   Management as per primary team.  DM (diabetes mellitus) ([facility])  No results found for: \"HGBA1C\"    Recent Labs     09/17/24  1700 09/17/24  2140 09/18/24  0811 09/18/24  1133   POCGLU 103 141* 86 127       Blood Sugar Average: Last 72 hrs:  (P) 110    Above case and plan discussed with attending neurologist, Dr. Matt Linton. Please see attestation for additional details/recommendations.    Reyes Lira will need follow-up in  8 weeks  with general neurology team for Other in 60 minute appointment. They will not require outpatient " neurological testing. Staff message sent to scheduling team.    History of Present Illness   78 y.o. male with HTN, DM2, and diabetic neuropathy who presented to the ED with L>R shaking, weakness and fatigue since 9/12/24. Patient diagnosed with Covid in the ED.     Patient reports he is feeling much better today. He reports that shaking of his extremities persists, but he feels this is much improved today. He otherwise is feeling at his baseline and is hoping to go home today. He denies any new or worsening symptoms. He notes that his cognition is good and he denies any memory concerns. He denies any difficulty ambulating and also denies baseline urinary incontinence. He does note that he had an episode here in the hospital but that he generally does not have issues with this.     Review of Systems   Constitutional:  Positive for fatigue. Negative for fever.   HENT:  Negative for trouble swallowing.    Eyes:  Negative for visual disturbance.   Respiratory:  Negative for shortness of breath.    Cardiovascular:  Negative for chest pain.   Gastrointestinal:  Negative for abdominal pain, constipation, nausea and vomiting.   Genitourinary:  Negative for difficulty urinating and dysuria.        Denies urinary incontinence at baseline, but does note he has had a few episodes while in the hospital.   Musculoskeletal:  Negative for back pain, gait problem and neck pain.   Neurological:  Positive for tremors, weakness and numbness. Negative for dizziness, facial asymmetry, speech difficulty, light-headedness and headaches.   Psychiatric/Behavioral:  Negative for confusion.      Medications  Scheduled Meds:  Current Facility-Administered Medications   Medication Dose Route Frequency Provider Last Rate    acetaminophen  650 mg Oral Q6H PRN Sergei Real MD      amLODIPine  10 mg Oral Daily Sergei Real MD      aspirin  81 mg Oral Daily Sergei Real MD      atorvastatin  40 mg Oral QPM Sergei Real MD      heparin (porcine)  5,000 Units  Subcutaneous Q8H JORGE A Real MD      insulin detemir  30 Units Subcutaneous Q12H JORGE A Real MD      insulin lispro  1-5 Units Subcutaneous TID AC Sergei Real MD      losartan  25 mg Oral Daily Sergei Real MD      sodium chloride  75 mL/hr Intravenous Continuous Sergei Real MD 75 mL/hr (09/17/24 1452)     Continuous Infusions:sodium chloride, 75 mL/hr, Last Rate: 75 mL/hr (09/17/24 1452)      PRN Meds:.  acetaminophen    Objective      Temp:  [97.9 °F (36.6 °C)-98.8 °F (37.1 °C)] 98.5 °F (36.9 °C)  HR:  [44-73] 65  Resp:  [18-20] 18  BP: (110-190)/() 110/77  O2 Device: None (Room air)          No intake/output data recorded.  Lines/Drains/Airways       Active Status       None                  Physical Exam  Constitutional:       General: He is not in acute distress.     Appearance: Normal appearance. He is not ill-appearing, toxic-appearing or diaphoretic.   HENT:      Head: Normocephalic and atraumatic.      Mouth/Throat:      Mouth: Mucous membranes are moist.      Pharynx: Oropharynx is clear. No oropharyngeal exudate or posterior oropharyngeal erythema.   Eyes:      General: No scleral icterus.        Right eye: No discharge.         Left eye: No discharge.      Extraocular Movements: Extraocular movements intact.      Conjunctiva/sclera: Conjunctivae normal.      Pupils: Pupils are equal, round, and reactive to light.   Musculoskeletal:         General: Normal range of motion.      Cervical back: Normal range of motion and neck supple.      Right lower leg: No edema.      Left lower leg: No edema.   Skin:     General: Skin is warm and dry.      Findings: No erythema or rash.   Neurological:      Mental Status: He is alert and oriented to person, place, and time.      Coordination: Finger-Nose-Finger Test normal.   Psychiatric:         Mood and Affect: Mood normal.         Speech: Speech normal.         Behavior: Behavior normal.     Neurologic Exam     Mental Status   Oriented to person, place, and  time.   Oriented to person.   Oriented to place.   Oriented to time.   Attention: normal. Concentration: normal.   Speech: speech is normal   Level of consciousness: alert    Cranial Nerves     CN II   Right visual field deficit: none  Left visual field deficit: none     CN III, IV, VI   Pupils are equal, round, and reactive to light.  Right pupil: Size: 3 mm. Shape: regular. Reactivity: brisk.   Left pupil: Size: 3 mm. Shape: regular. Reactivity: brisk.   Nystagmus: none   Diplopia: none  Ophthalmoparesis: none  Upgaze: normal  Downgaze: normal  Conjugate gaze: present    CN V   Right facial sensation deficit: none  Left facial sensation deficit: none    CN VII   Right facial weakness: none  Left facial weakness: none    CN XII   Tongue: not atrophic  Fasciculations: absent  Tongue deviation: none    Motor Exam   Muscle bulk: normal  Overall muscle tone: normal  Right arm tone: normal  Left arm tone: normal  Right leg tone: normal  Left leg tone: normalMotor strength 5/5 B/L UE and LE.     Sensory Exam   Right arm light touch: normal  Left arm light touch: normal  Right leg light touch: normal  Left leg light touch: normal    Gait, Coordination, and Reflexes     Coordination   Finger to nose coordination: normalMyoclonus evident at rest > action, left greater than right.       Lab Results: I have reviewed the following results:   Recent Results (from the past 24 hour(s))   ECG 12 lead    Collection Time: 09/17/24  8:42 AM   Result Value Ref Range    Ventricular Rate 74 BPM    Atrial Rate 70 BPM    NH Interval 188 ms    QRSD Interval 124 ms    QT Interval 438 ms    QTC Interval 486 ms    P [place] 37 degrees    QRS Axis -57 degrees    T Wave Axis 40 degrees   CBC and differential    Collection Time: 09/17/24  9:28 AM   Result Value Ref Range    WBC 7.57 4.31 - 10.16 Thousand/uL    RBC 4.36 3.88 - 5.62 Million/uL    Hemoglobin 11.8 (L) 12.0 - 17.0 g/dL    Hematocrit 36.9 36.5 - 49.3 %    MCV 85 82 - 98 fL    MCH 27.1  "26.8 - 34.3 pg    MCHC 32.0 31.4 - 37.4 g/dL    RDW 13.1 11.6 - 15.1 %    MPV 9.4 8.9 - 12.7 fL    Platelets 247 149 - 390 Thousands/uL    nRBC 0 /100 WBCs    Segmented % 51 43 - 75 %    Immature Grans % 1 0 - 2 %    Lymphocytes % 36 14 - 44 %    Monocytes % 9 4 - 12 %    Eosinophils Relative 2 0 - 6 %    Basophils Relative 1 0 - 1 %    Absolute Neutrophils 3.94 1.85 - 7.62 Thousands/µL    Absolute Immature Grans 0.04 0.00 - 0.20 Thousand/uL    Absolute Lymphocytes 2.70 0.60 - 4.47 Thousands/µL    Absolute Monocytes 0.67 0.17 - 1.22 Thousand/µL    Eosinophils Absolute 0.17 0.00 - 0.61 Thousand/µL    Basophils Absolute 0.05 0.00 - 0.10 Thousands/µL   Comprehensive metabolic panel    Collection Time: 09/17/24  9:28 AM   Result Value Ref Range    Sodium 137 135 - 147 mmol/L    Potassium 4.1 3.5 - 5.3 mmol/L    Chloride 102 96 - 108 mmol/L    CO2 26 21 - 32 mmol/L    ANION GAP 9 4 - 13 mmol/L    BUN 15 5 - 25 mg/dL    Creatinine 1.38 (H) 0.60 - 1.30 mg/dL    Glucose 161 (H) 65 - 140 mg/dL    Calcium 8.6 8.4 - 10.2 mg/dL    AST 17 13 - 39 U/L    ALT 7 7 - 52 U/L    Alkaline Phosphatase 57 34 - 104 U/L    Total Protein 7.0 6.4 - 8.4 g/dL    Albumin 3.9 3.5 - 5.0 g/dL    Total Bilirubin 0.47 0.20 - 1.00 mg/dL    eGFR 48 ml/min/1.73sq m   Magnesium    Collection Time: 09/17/24  9:28 AM   Result Value Ref Range    Magnesium 1.9 1.9 - 2.7 mg/dL   HS Troponin 0hr (reflex protocol)    Collection Time: 09/17/24  9:28 AM   Result Value Ref Range    hs TnI 0hr 4 \"Refer to ACS Flowchart\"- see link ng/L   TSH, 3rd generation with Free T4 reflex    Collection Time: 09/17/24  9:28 AM   Result Value Ref Range    TSH 3RD GENERATON 2.085 0.450 - 4.500 uIU/mL   Protime-INR    Collection Time: 09/17/24  9:28 AM   Result Value Ref Range    Protime 13.7 12.3 - 15.0 seconds    INR 0.98 0.85 - 1.19   APTT    Collection Time: 09/17/24  9:28 AM   Result Value Ref Range    PTT 20 (L) 23 - 34 seconds   Lactic acid, plasma (w/reflex if result > " "2.0)    Collection Time: 09/17/24  9:28 AM   Result Value Ref Range    LACTIC ACID 1.0 0.5 - 2.0 mmol/L   FLU/RSV/COVID - if FLU/RSV clinically relevant (2hr TAT)    Collection Time: 09/17/24  9:30 AM    Specimen: Nose; Nares   Result Value Ref Range    SARS-CoV-2 Positive (A) Negative    INFLUENZA A PCR Negative Negative    INFLUENZA B PCR Negative Negative    RSV PCR Negative Negative   UA w Reflex to Microscopic w Reflex to Culture    Collection Time: 09/17/24 11:46 AM    Specimen: Urine, Clean Catch   Result Value Ref Range    Color, UA Yellow     Clarity, UA Clear     Specific Gravity, UA 1.021 1.003 - 1.030    pH, UA 5.5 4.5, 5.0, 5.5, 6.0, 6.5, 7.0, 7.5, 8.0    Leukocytes, UA Negative Negative    Nitrite, UA Negative Negative    Protein, UA Trace (A) Negative mg/dl    Glucose, UA Negative Negative mg/dl    Ketones, UA Negative Negative mg/dl    Urobilinogen, UA <2.0 <2.0 mg/dl mg/dl    Bilirubin, UA Negative Negative    Occult Blood, UA Negative Negative   Urine Microscopic    Collection Time: 09/17/24 11:46 AM   Result Value Ref Range    RBC, UA 1-2 None Seen, 1-2 /hpf    WBC, UA 1-2 None Seen, 1-2 /hpf    Epithelial Cells Occasional None Seen, Occasional /hpf    Bacteria, UA None Seen None Seen, Occasional /hpf   HS Troponin I 2hr    Collection Time: 09/17/24 11:49 AM   Result Value Ref Range    hs TnI 2hr 4 \"Refer to ACS Flowchart\"- see link ng/L    Delta 2hr hsTnI 0 <20 ng/L   Echo complete w/ contrast if indicated    Collection Time: 09/17/24  3:36 PM   Result Value Ref Range    LA/Ao Ratio 2D 1.22     MV Peak A Juan Luis 0.89 m/s    MV stenosis pressure 1/2 time 122 ms    MV Peak E Juan Luis 63 cm/s    E wave deceleration time 417 ms    E/A ratio 0.71     MV valve area p 1/2 method 1.80     RVID d 3.1 cm    A4C EF 72 %    Left ventricular stroke volume (2D) 125.00 mL    IVSd 1.00 cm    Tricuspid annular plane systolic excursion 2.30 cm    Ao root 3.20 cm    LVPWd 1.00 cm    LA size 3.9 cm    Asc Ao 3.7 cm    FS 43 " 28 - 44    LVIDS 3.30 cm    IVS 1 cm    LVIDd 5.80 cm    LEFT VENTRICLE SYSTOLIC VOLUME (MOD BIPLANE) 2D 43 mL    LV DIASTOLIC VOLUME (MOD BIPLANE) 2D 168 mL    Left Atrium Area-systolic Four Chamber 22.9 cm2    Left Atrium Area-systolic Apical Two Chamber 10.2 cm2    MV E' Tissue Velocity Septal 5 cm/s    LVSV, 2D 125 mL    BSA 2.22 m2    LV EF 55    Fingerstick Glucose (POCT)    Collection Time: 09/17/24  5:00 PM   Result Value Ref Range    POC Glucose 103 65 - 140 mg/dl   Fingerstick Glucose (POCT)    Collection Time: 09/17/24  9:40 PM   Result Value Ref Range    POC Glucose 141 (H) 65 - 140 mg/dl   Fingerstick Glucose (POCT)    Collection Time: 09/18/24  8:11 AM   Result Value Ref Range    POC Glucose 86 65 - 140 mg/dl       Imaging Review: Personally reviewed the following image studies in PACS and associated radiology reports: MRI brain without contrast: No recent infarct, acute intracranial hemorrhage or mass effect. Chronic microangiopathic changes and remote lacunar infarcts. Findings suggestive of normal pressure hydrocephalus in the correct clinical setting. Clinical correlation advised. Loss of flow void within the left intradural vertebral artery which may be due to slow flow given recent CTA head and neck demonstrating high-grade stenosis distally.     VTE Pharmacologic Prophylaxis: Heparin

## 2024-09-18 NOTE — CASE MANAGEMENT
Case Management Assessment & Discharge Planning Note    Patient name Reyes Lira  Location 2 [facility] 279/2 E 279-01 MRN [id number]  : 1945 Date 2024       Current Admission Date: 2024  Current Admission Diagnosis:Weakness   Patient Active Problem List    Diagnosis Date Noted Date Diagnosed    COVID 2024     Weakness 2024     JOIE (acute kidney injury) (HCC) 2024     Myoclonus 2024     HTN (hypertension) 2024     DM (diabetes mellitus) ([facility]) 2024       LOS (days): 1  Geometric Mean LOS (GMLOS) (days):   Days to GMLOS:     OBJECTIVE:    Risk of Unplanned Readmission Score: 9.84      Current admission status: Inpatient       Preferred Pharmacy:   [facility]/pharmacy #[id number] - [address] [address]  Phone: [phone] Fax: [phone]    Primary Care Provider: Tyrese Mancilla MD    Primary Insurance: [facility]  Secondary Insurance: MEDICARE    ASSESSMENT:  Active Health Care Proxies       Chinyere Lira Health Care Agent - Spouse   Primary Phone: [phone] (Home)                 Advance Directives  Does patient have a Health Care POA?: Yes  Does patient have Advance Directives?: Yes  Advance Directives: Living will, Power of  for health care, Power of  for finance  Primary Contact: Patient's Wife (Chinyere)    Readmission Root Cause  30 Day Readmission: No    Patient Information  Admitted from:: Home  Mental Status: Alert  During Assessment patient was accompanied by: Not accompanied during assessment  Assessment information provided by:: Patient, Spouse (Patient is Covid+ and asked CM to call his wife to complete the assessment.)  Primary Caregiver: Self  Support Systems: Self, Spouse/significant other, Friend  County of Residence: [place]  What city do you live in?: [place]  Home entry access options. Select all that apply.: Stairs  Number  of steps to enter home.: 4  Do the steps have railings?: Yes  Type of Current Residence: [facility]  Living Arrangements: Lives w/ Spouse/significant other  Is patient a ?: Yes  Is patient active with [facility] ([employer])?: Yes  Is patient service connected?: Yes    Activities of Daily Living Prior to Admission  Functional Status: Independent  Completes ADLs independently?: Yes  Ambulates independently?: Yes  Does patient use assisted devices?: Yes  Assisted Devices (DME) used: Straight Cane  Does patient currently own DME?: Yes  What DME does the patient currently own?: Shower Chair, Straight Cane  Does patient have a history of Outpatient Therapy (PT/OT)?: No  Does the patient have a history of Short-Term Rehab?: No  Does patient have a history of HHC?: No  Does patient currently have HHC?: No    Patient Information Continued  Income Source: Pension/[employer]  Does patient have prescription coverage?: Yes (Patient's wife confirmed that [facility] uses [facility] and [facility] [place], and she denied any barriers to obtaining or affording prescriptions.)  Does patient receive dialysis treatments?: No  Does patient have a history of substance abuse?: No  Does patient have a history of Mental Health Diagnosis?: No    PHQ 2/9 Screening   Reviewed PHQ 2/9 Depression Screening Score?: No    Means of Transportation  Means of Transport to Appts:: Family transport    Social Determinants of Health (SDOH)      Flowsheet Row Most Recent Value   Housing Stability    In the last 12 months, was there a time when you were not able to pay the mortgage or rent on time? N   In the past 12 months, how many times have you moved where you were living? 0   At any time in the past 12 months, were you homeless or living in a shelter (including now)? N   Transportation Needs    In the past 12 months, has lack of transportation kept you from medical appointments or from getting medications? no   In the past 12 months,  has lack of transportation kept you from meetings, work, or from getting things needed for daily living? No   Food Insecurity    Within the past 12 months, you worried that your food would run out before you got the money to buy more. Never true   Within the past 12 months, the food you bought just didn't last and you didn't have money to get more. Never true   Utilities    In the past 12 months has the electric, gas, oil, or water company threatened to shut off services in your home? No        DISCHARGE DETAILS:    Discharge planning discussed with:: Patient and Patient's Wife  Freedom of Choice: Yes  Comments - Freedom of Choice: CM discussed FOC as it pertains to discharge planning. CM reviewed Level I PT/OT Recommendation with patient, and he declined. Patient prefers to return home with VNA and his wife's support. CM called patient's wife to review the same, and she is in agreement with patient. Both declined STR and prefer VNA. They denied any preferences as to which agency they wish to use. Patient's wife then confirmed that she will provide transportation home at the time of discharge.  CM contacted family/caregiver?: Yes  Were Treatment Team discharge recommendations reviewed with patient/caregiver?: Yes  Did patient/caregiver verbalize understanding of patient care needs?: Yes  Were patient/caregiver advised of the risks associated with not following Treatment Team discharge recommendations?: Yes    Contacts  Patient Contacts: Chinyere  Relationship to Patient:: Family  Contact Method: Phone  Phone Number: [phone]  Reason/Outcome: Continuity of Care, Discharge Planning    Requested Home Health Care         Is the patient interested in HHC at discharge?: Yes  Home Health Discipline requested:: Occupational Therapy, Physical Therapy  Home Health Agency Name:: Other ([place] referral sent.)  Home Health Follow-Up Provider:: PCP  Home Health Services Needed:: Evaluate Functional Status and Safety, Gait/ADL  Training, Strengthening/Theraputic Exercises to Improve Function  Homebound Criteria Met:: Requires the Assistance of Another Person for Safe Ambulation or to Leave the Home, Uses an Assist Device (i.e. cane, walker, etc)  Supporting Clincal Findings:: Fatigues Easliy in Short Distances, Limited Endurance    DME Referral Provided  Referral made for DME?: No    Other Referral/Resources/Interventions Provided:  Interventions: HHC  Referral Comments: CM sent a blanket referral for HHC via [facility] to determine who can accept. MANUELA then sent an email to [facility] the [facility] requesting [facility] orders.    Would you like to participate in our [facility] service program?  : No - Declined    Treatment Team Recommendation: Acute Rehab  Discharge Destination Plan:: Home with Home Health Care  Transport at Discharge : Family

## 2024-09-18 NOTE — ASSESSMENT & PLAN NOTE
"No results found for: \"HGBA1C\"    Recent Labs     09/17/24  1700 09/17/24  2140 09/18/24  0811   POCGLU 103 141* 86       Blood Sugar Average: Last 72 hrs:  (P) 110  History of diabetes reported  Patient reported that he takes insulin detemir 44 units in the morning and 50 units at night.  However per documentation and last office visit he was only recorded that he takes 44 units daily.  He did report he took morning determir insulin already today.   Accu-Cheks noted well-controlled.  Will decrease detemir to 15 u BID for now and monitor for hypoglycemia   Sliding scale is also ordered   "

## 2024-09-18 NOTE — ASSESSMENT & PLAN NOTE
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  No additional toxic metabolic derangements have been identified at this time.    Continue evaluation of toxic/metabolic derangements  Clarify if there has been any recent medication changes, addition of any medications.   Patient does take Glycolax, patient reports he is taking this as prescribed but does report that he missed one dose this week due to feeling unwell.

## 2024-09-18 NOTE — ASSESSMENT & PLAN NOTE
Unclear etiology.  Brain MRI negative for acute finding.  Suspected secondary to COVID-19 infection.  Currently symptoms significantly improved.  PT OT pending.

## 2024-09-18 NOTE — PLAN OF CARE
Problem: Potential for Falls  Goal: Patient will remain free of falls  Description: INTERVENTIONS:  - Educate patient/family on patient safety including physical limitations  - Instruct patient to call for assistance with activity   - Consult OT/PT to assist with strengthening/mobility   - Keep Call bell within reach  - Keep bed low and locked with side rails adjusted as appropriate  - Keep care items and personal belongings within reach  - Initiate and maintain comfort rounds  - Make Fall Risk Sign visible to staff  - Offer Toileting every  Hours, in advance of need  - Initiate/Maintain alarm  - Obtain necessary fall risk management equipment:   - Apply yellow socks and bracelet for high fall risk patients  - Consider moving patient to room near nurses station  Outcome: Progressing     Problem: Potential for Falls  Goal: Patient will remain free of falls  Description: INTERVENTIONS:  - Educate patient/family on patient safety including physical limitations  - Instruct patient to call for assistance with activity   - Consult OT/PT to assist with strengthening/mobility   - Keep Call bell within reach  - Keep bed low and locked with side rails adjusted as appropriate  - Keep care items and personal belongings within reach  - Initiate and maintain comfort rounds  - Make Fall Risk Sign visible to staff  - Offer Toileting every  Hours, in advance of need  - Initiate/Maintain alarm  - Obtain necessary fall risk management equipment:   - Apply yellow socks and bracelet for high fall risk patients  - Consider moving patient to room near nurses station  Outcome: Progressing

## 2024-09-18 NOTE — OCCUPATIONAL THERAPY NOTE
"    Occupational Therapy Evaluation     Patient Name: Reyes Lira  Today's Date: 9/18/2024  Problem List  Principal Problem:    Weakness  Active Problems:    COVID    JOIE (acute kidney injury) (HCC)    Myoclonus    HTN (hypertension)    DM (diabetes mellitus) (HCC)    Past Medical History  Past Medical History:   Diagnosis Date    Diabetes mellitus (HCC)     Hypertension      Past Surgical History  History reviewed. No pertinent surgical history.        09/18/24 0914   OT Last Visit   OT Visit Date 09/18/24   Note Type   Note type Evaluation   Pain Assessment   Pain Assessment Tool 0-10   Pain Score No Pain   Restrictions/Precautions   Weight Bearing Precautions Per Order No   Other Precautions Contact/isolation;Airborne/isolation;Chair Alarm;Bed Alarm;Telemetry;Fall Risk   Home Living   Type of Home House   Home Layout One level;Laundry in basement;Performs ADLs on one level;Able to live on main level with bedroom/bathroom  (3-4 CECIL with rails)   Bathroom Shower/Tub Tub/shower unit   Bathroom Toilet Standard   Bathroom Equipment Shower chair;Grab bars around toilet;Grab bars in shower   Bathroom Accessibility Accessible   Home Equipment Cane;Wheelchair-manual  (standard walker)   Prior Function   Level of [place] Independent with ADLs;Independent with functional mobility;Needs assistance with IADLS   Lives With Spouse   Receives Help From Family   IADLs Family/Friend/Other provides transportation;Family/Friend/Other provides meals;Independent with medication management   Falls in the last 6 months 1 to 4  (1 \"stumble\")   Vocational Retired   Lifestyle   Intrinsic Gratification gardening, wasn't able to garden this summer   General   Family/Caregiver Present No   Subjective   Subjective Pt agreeable to therapy evaluation   ADL   Where Assessed Other (Comment)  (Assist levels for some self care tasks are based on functional assessment of performance skills and deficits observed during session.)   Eating " Assistance 5  Supervision/Setup   Eating Deficit Setup   Grooming Assistance 5  Supervision/Setup   Grooming Deficit Setup  (seated)   UB Dressing Assistance 4  Minimal Assistance   UB Dressing Deficit Setup;Increased time to complete;Supervision/safety  (seated)   LB Dressing Assistance 3  Moderate Assistance   LB Dressing Deficit Setup;Supervision/safety;Increased time to complete   Toileting Assistance  3  Moderate Assistance   Toileting Deficit Setup;Supervison/safety;Increased time to complete   Bed Mobility   Supine to Sit 4  Minimal assistance   Additional items Assist x 1;HOB elevated;Bedrails;Increased time required   Transfers   Sit to Stand 4  Minimal assistance   Additional items Assist x 2;Increased time required;Verbal cues   Stand to Sit 4  Minimal assistance   Additional items Assist x 2;Armrests;Increased time required   Stand pivot 4  Minimal assistance   Additional items Assist x 2;Increased time required;Verbal cues   Additional Comments with RW, pt overall unsteady 2/2 shakiness   Functional Mobility   Functional Mobility 4  Minimal assistance   Additional Comments assist x2   Additional items Rolling walker   Activity Tolerance   Activity Tolerance Patient limited by fatigue   Medical Staff Made Aware Princess PT  (Pt seen for co-evaluation with Physical Therapist due to pt's medical complexity, functional limitations and limited activity tolerance.)   Nurse Made Aware Magy BLACKWELL   RUE Assessment   RUE Assessment WFL  (grossly 4/5 throughout)   LUE Assessment   LUE Assessment WFL  (grossly 4/5 throughout)   Hand Function   Gross Motor Coordination Functional   Fine Motor Coordination   (mildly impaired L>R 2/2 myoclonic jerking motions in UEs, trunck, and LEs. Shaking decreases and movement quality improves during purposeful movements.)   Vision-Basic Assessment   Current Vision Wears glasses only for reading   Psychosocial   Psychosocial (WDL) WDL   Cognition   Overall Cognitive Status WFL    Arousal/Participation Alert;Cooperative   Attention Within functional limits   Orientation Level Oriented X4   Memory Within functional limits   Following Commands Follows all commands and directions without difficulty   Assessment   Limitation Decreased ADL status;Decreased endurance;Decreased high-level ADLs   Assessment Pt is a 78 y.o. male seen for OT evaluation s/p admit to [facility] on 9/17/2024 w/ Weakness. Pt found to be COVID+. Comorbidities affecting pt's functional performance at time of assessment include: DM, HTN, obesity, previous surgery, and myoclonus . Personal factors affecting pt at time of IE include:steps to enter environment, difficulty performing ADLS, difficulty performing IADLS , and health management . Prior to admission, pt was independent with all ADLs and functional mobility with intermittent use of SPC. Upon evaluation: Pt requires Minimal Assistance x2 with RW for mobility, and Minimal Assistance to Moderate Assistance for ADLs 2* the following deficits impacting occupational performance: decreased strength, decreased balance, decreased tolerance, and myoclonic jerking motions . Pt to benefit from continued skilled OT tx while in the hospital to address deficits as defined above and maximize level of functional independence w ADL's and functional mobility. Occupational Performance areas to address include: grooming, bathing/shower, toilet hygiene, dressing, and functional mobility. From OT standpoint, recommendation at time of d/c would be Level 1 Maximum Resource Intensity.   Goals   Patient Goals to go home   Plan   Treatment Interventions ADL retraining;Functional transfer training;Endurance training;Patient/family training   Goal Expiration Date 10/02/24   OT Treatment Day 0   OT Frequency 3-5x/wk   Discharge Recommendation   Rehab Resource Intensity Level, OT I (Maximum Resource Intensity)   AM-PAC Daily Activity Inpatient   Lower Body Dressing 3   Bathing 3   Toileting 3   Upper  Body Dressing 3   Grooming 3   Eating 4   Daily Activity Raw Score 19   Daily Activity Standardized Score (Calc for Raw Score >=11) 40.22   AM-PAC Applied Cognition Inpatient   Following a Speech/Presentation 4   Understanding Ordinary Conversation 4   Taking Medications 4   Remembering Where Things Are Placed or Put Away 4   Remembering List of 4-5 Errands 3   Taking Care of Complicated Tasks 3   Applied Cognition Raw Score 22   Applied Cognition Standardized Score 47.83       Goals: to be met by 10/02/24    Patient will perform functional bed mobility with modified independence, with HOB flat, no rails  Patient will perform functional transfers with modified independence using LRAD in preparation for ADL tasks, with good safety awareness  Patient will perform UB dressing task with independence while seated, with set up  Patient will perform LB dressing task with independence  Patient will perform toilet transfer with independence  Patient will perform toileting with independence, including hygiene and clothing management   Patient will increase BUE strength by 1 MM grade in preparation to increase ability to participate in ADL tasks  Patient will improve activity tolerance by participating in 25 minutes of session at a time in preparation for participation in ADL tasks  Patient will identify 3 potential fall hazards and identify compensatory techniques to decrease fall risk in the home environment         Nancy Swan OTR/L

## 2024-09-18 NOTE — PROGRESS NOTES
Progress Note - Hospitalist   Name: Reyes Lira 78 y.o. male I MRN: [id number]  Unit/Bed#: 2 E 279-01 I Date of Admission: 9/17/2024   Date of Service: 9/18/2024 I Hospital Day: 1    Assessment & Plan  Weakness  Patient was found to have weakness in the setting of COVID.  However described increased weakness in the left side compared to the right.    CTH negative. 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 There is flow reconstitution at mid/distal V2 segment with multifocal severe stenosis. The V3 and V4 segments are patent with high-grade stenosis at distal V4 segment. Atherosclerotic stenosis of bilateral cavernous and supraclinoid intracranial ICAs, severe stenosis in the distal left cavernous ICA. No hemodynamically significant stenosis in bilateral cervical carotid and right vertebral arteries.   Brain MRI report noted negative for acute infarct within changes remote lacunar infarct.    Currently patient is report overall feeling much better with his symptoms of myoclonus and tremors.  Denies any other new acute complaints.  2D echo pending.  Continue with aspirin, statin  Follow-up with PT OT eval.  Continue supportive care.      COVID  Patient presenting with weakness, shaking/tremor and fatgiue and found to be COVID-positive  Patient reports not feeling well overall for last few days prior to presentation to the emergency room.  O2 saturation 95 to 96% room air.  Continue with supportive care.  Wife was recently sick with similar illness  Monitor for fevers, PRN tylenol  Myoclonus  Unclear etiology.  Brain MRI negative for acute finding.  Suspected secondary to COVID-19 infection.  Currently symptoms significantly improved.  PT OT pending.  HTN (hypertension)  Patient takes amlodipine 10 mg daily, losartan 50 mg daily, hydrochlorothiazide 12.5 mg daily at home  Goal is systolics around 180.  He resumed amlodipine and losartan, continue to hold hydrochlorothiazide for  "now and monitor blood pressure readings  DM (diabetes mellitus) ([facility])  No results found for: \"HGBA1C\"    Recent Labs     24  1700 24  2140 24  0811   POCGLU 103 141* 86       Blood Sugar Average: Last 72 hrs:  (P) 110  History of diabetes reported  Patient reported that he takes insulin detemir 44 units in the morning and 50 units at night.  However per documentation and last office visit he was only recorded that he takes 44 units daily.  He did report he took morning determir insulin already today.   Accu-Cheks noted well-controlled.  Will decrease detemir to 15 u BID for now and monitor for hypoglycemia   Sliding scale is also ordered     VTE Pharmacologic Prophylaxis:   Moderate Risk (Score 3-4) - Pharmacological DVT Prophylaxis Ordered: heparin.    Mobility:   Basic Mobility Inpatient Raw Score: 24  -HLM Goal: 8: Walk 250 feet or more  JH-HLM Achieved: 6: Walk 10 steps or more      Patient Centered Rounds: I performed bedside rounds with nursing staff today.   Discussions with Specialists or Other Care Team Provider: Discussed with neurology team.      Current Length of Stay: 1 day(s)  Current Patient Status: Inpatient   Certification Statement: The patient will continue to require additional inpatient hospital stay due to echo pending, PT OT eval pending  Discharge Plan: Anticipate discharge tomorrow to discharge location to be determined pending rehab evaluations..  Patient is adamant about not going to inpatient rehab  Code Status: Level 1 - Full Code    Subjective   Seen during a.m. rounds.  Patient appears comfortable not in distress.  Reports her symptoms of myoclonus and tremors have improved significantly.  Currently denies any new complaints.  Eager to go home and declining to go to rehab.    Objective     Vitals:   Temp (24hrs), Av.4 °F (36.9 °C), Min:98 °F (36.7 °C), Max:98.8 °F (37.1 °C)    Temp:  [98 °F (36.7 °C)-98.8 °F (37.1 °C)] 98.5 °F (36.9 °C)  HR:  [44-73] 65  Resp:  " [18-20] 18  BP: (110-190)/() 110/77  SpO2:  [95 %-100 %] 97 %  Body mass index is 29.84 kg/m².     Input and Output Summary (last 24 hours):   No intake or output data in the 24 hours ending 09/18/24 0910    Physical Exam  Constitutional:       General: He is not in acute distress.     Appearance: He is not toxic-appearing.      Comments: Elderly male patient, acutely nontoxic appearing.  Reports that he does have history of speech impairment and stuttering.   HENT:      Head: Normocephalic and atraumatic.   Cardiovascular:      Rate and Rhythm: Normal rate.      Pulses: Normal pulses.   Pulmonary:      Effort: Pulmonary effort is normal. No respiratory distress.      Breath sounds: Normal breath sounds. No wheezing.   Abdominal:      General: Bowel sounds are normal. There is no distension.      Palpations: Abdomen is soft.      Tenderness: There is no abdominal tenderness.   Musculoskeletal:      Cervical back: Neck supple.      Right lower leg: No edema.      Left lower leg: No edema.   Neurological:      Mental Status: He is alert and oriented to person, place, and time. Mental status is at baseline.   Psychiatric:         Mood and Affect: Mood normal.         Behavior: Behavior normal.          Lines/Drains:  Lines/Drains/Airways       Active Status       None                      Telemetry:  Telemetry Orders (From admission, onward)               24 Hour Telemetry Monitoring  Continuous x 24 Hours (Telem)        Question:  Reason for 24 Hour Telemetry  Answer:  TIA/Suspected CVA/ Confirmed CVA                     Telemetry Reviewed: Normal Sinus Rhythm  Indication for Continued Telemetry Use: No indication for continued use. Will discontinue.                Lab Results: I have reviewed the following results: CBC/BMP: No new results in last 24 hours.    Results from last 7 days   Lab Units 09/17/24  0928   WBC Thousand/uL 7.57   HEMOGLOBIN g/dL 11.8*   HEMATOCRIT % 36.9   PLATELETS Thousands/uL 247   SEGS  PCT % 51   LYMPHO PCT % 36   MONO PCT % 9   EOS PCT % 2     Results from last 7 days   Lab Units 09/17/24  0928   SODIUM mmol/L 137   POTASSIUM mmol/L 4.1   CHLORIDE mmol/L 102   CO2 mmol/L 26   BUN mg/dL 15   CREATININE mg/dL 1.38*   ANION GAP mmol/L 9   CALCIUM mg/dL 8.6   ALBUMIN g/dL 3.9   TOTAL BILIRUBIN mg/dL 0.47   ALK PHOS U/L 57   ALT U/L 7   AST U/L 17   GLUCOSE RANDOM mg/dL 161*     Results from last 7 days   Lab Units 09/17/24  0928   INR  0.98     Results from last 7 days   Lab Units 09/18/24  0811 09/17/24  2140 09/17/24  1700   POC GLUCOSE mg/dl 86 141* 103         Results from last 7 days   Lab Units 09/17/24  0928   LACTIC ACID mmol/L 1.0       Recent Cultures (last 7 days):         Imaging Review: Reviewed radiology reports from this admission including: CT head and MRI brain.      Last 24 Hours Medication List:     Current Facility-Administered Medications:     acetaminophen (TYLENOL) tablet 650 mg, Q6H PRN    amLODIPine (NORVASC) tablet 10 mg, Daily    aspirin chewable tablet 81 mg, Daily    atorvastatin (LIPITOR) tablet 40 mg, QPM    heparin (porcine) subcutaneous injection 5,000 Units, Q8H JORGE A    insulin detemir (LEVEMIR) subcutaneous injection 30 Units, Q12H JORGE A    insulin lispro (HumALOG/ADMELOG) 100 units/mL subcutaneous injection 1-5 Units, TID AC **AND** Fingerstick Glucose (POCT), TID AC    losartan (COZAAR) tablet 25 mg, Daily    sodium chloride 0.9 % infusion, Continuous, Last Rate: 75 mL/hr (09/17/24 1452)    Administrative Statements   Today, Patient Was Seen By: Skyler Huff MD  I have spent a total time of 35 minutes in caring for this patient on the day of the visit/encounter including Diagnostic results, Risk factor reductions, Counseling / Coordination of care, Documenting in the medical record, Reviewing / ordering tests, medicine, procedures  , and Obtaining or reviewing history  .    **Please Note: This note may have been constructed using a voice recognition system.**

## 2024-09-19 VITALS
HEART RATE: 82 BPM | DIASTOLIC BLOOD PRESSURE: 89 MMHG | OXYGEN SATURATION: 97 % | WEIGHT: 220 LBS | RESPIRATION RATE: 18 BRPM | SYSTOLIC BLOOD PRESSURE: 124 MMHG | BODY MASS INDEX: 29.8 KG/M2 | TEMPERATURE: 98.3 F | HEIGHT: 72 IN

## 2024-09-19 LAB
ANION GAP SERPL CALCULATED.3IONS-SCNC: 8 MMOL/L (ref 4–13)
BUN SERPL-MCNC: 16 MG/DL (ref 5–25)
CALCIUM SERPL-MCNC: 8.9 MG/DL (ref 8.4–10.2)
CHLORIDE SERPL-SCNC: 102 MMOL/L (ref 96–108)
CHOLEST SERPL-MCNC: 99 MG/DL
CO2 SERPL-SCNC: 28 MMOL/L (ref 21–32)
CREAT SERPL-MCNC: 1.27 MG/DL (ref 0.6–1.3)
ERYTHROCYTE [DISTWIDTH] IN BLOOD BY AUTOMATED COUNT: 13.3 % (ref 11.6–15.1)
EST. AVERAGE GLUCOSE BLD GHB EST-MCNC: 189 MG/DL
GFR SERPL CREATININE-BSD FRML MDRD: 53 ML/MIN/1.73SQ M
GLUCOSE SERPL-MCNC: 152 MG/DL (ref 65–140)
GLUCOSE SERPL-MCNC: 152 MG/DL (ref 65–140)
GLUCOSE SERPL-MCNC: 65 MG/DL (ref 65–140)
HBA1C MFR BLD: 8.2 %
HCT VFR BLD AUTO: 40.1 % (ref 36.5–49.3)
HDLC SERPL-MCNC: 38 MG/DL
HGB BLD-MCNC: 12.9 G/DL (ref 12–17)
LDLC SERPL CALC-MCNC: 45 MG/DL (ref 0–100)
MCH RBC QN AUTO: 27.4 PG (ref 26.8–34.3)
MCHC RBC AUTO-ENTMCNC: 32.2 G/DL (ref 31.4–37.4)
MCV RBC AUTO: 85 FL (ref 82–98)
NONHDLC SERPL-MCNC: 61 MG/DL
PLATELET # BLD AUTO: 269 THOUSANDS/UL (ref 149–390)
PMV BLD AUTO: 9.5 FL (ref 8.9–12.7)
POTASSIUM SERPL-SCNC: 3.7 MMOL/L (ref 3.5–5.3)
RBC # BLD AUTO: 4.71 MILLION/UL (ref 3.88–5.62)
SODIUM SERPL-SCNC: 138 MMOL/L (ref 135–147)
TRIGL SERPL-MCNC: 81 MG/DL
WBC # BLD AUTO: 8.12 THOUSAND/UL (ref 4.31–10.16)

## 2024-09-19 PROCEDURE — 82948 REAGENT STRIP/BLOOD GLUCOSE: CPT

## 2024-09-19 PROCEDURE — 99239 HOSP IP/OBS DSCHRG MGMT >30: CPT | Performed by: STUDENT IN AN ORGANIZED HEALTH CARE EDUCATION/TRAINING PROGRAM

## 2024-09-19 PROCEDURE — 83036 HEMOGLOBIN GLYCOSYLATED A1C: CPT | Performed by: STUDENT IN AN ORGANIZED HEALTH CARE EDUCATION/TRAINING PROGRAM

## 2024-09-19 PROCEDURE — 80048 BASIC METABOLIC PNL TOTAL CA: CPT | Performed by: STUDENT IN AN ORGANIZED HEALTH CARE EDUCATION/TRAINING PROGRAM

## 2024-09-19 PROCEDURE — 85027 COMPLETE CBC AUTOMATED: CPT | Performed by: STUDENT IN AN ORGANIZED HEALTH CARE EDUCATION/TRAINING PROGRAM

## 2024-09-19 PROCEDURE — 80061 LIPID PANEL: CPT | Performed by: STUDENT IN AN ORGANIZED HEALTH CARE EDUCATION/TRAINING PROGRAM

## 2024-09-19 RX ORDER — INSULIN DEGLUDEC 100 U/ML
15 INJECTION, SOLUTION SUBCUTANEOUS EVERY 12 HOURS SCHEDULED
Start: 2024-09-19 | End: 2024-09-19

## 2024-09-19 RX ORDER — AMLODIPINE BESYLATE 10 MG/1
10 TABLET ORAL DAILY
Qty: 30 TABLET | Refills: 0 | Status: SHIPPED | OUTPATIENT
Start: 2024-09-20

## 2024-09-19 RX ORDER — ATORVASTATIN CALCIUM 40 MG/1
40 TABLET, FILM COATED ORAL EVERY EVENING
Qty: 30 TABLET | Refills: 0 | Status: SHIPPED | OUTPATIENT
Start: 2024-09-19

## 2024-09-19 RX ORDER — INSULIN DETEMIR 100 [IU]/ML
15 INJECTION, SOLUTION SUBCUTANEOUS EVERY 12 HOURS SCHEDULED
Start: 2024-09-19

## 2024-09-19 RX ORDER — LOSARTAN POTASSIUM 25 MG/1
25 TABLET ORAL DAILY
Qty: 30 TABLET | Refills: 0 | Status: SHIPPED | OUTPATIENT
Start: 2024-09-20

## 2024-09-19 RX ADMIN — ASPIRIN 81 MG: 81 TABLET, CHEWABLE ORAL at 08:36

## 2024-09-19 RX ADMIN — AMLODIPINE BESYLATE 10 MG: 10 TABLET ORAL at 08:36

## 2024-09-19 RX ADMIN — LOSARTAN POTASSIUM 25 MG: 25 TABLET, FILM COATED ORAL at 08:36

## 2024-09-19 RX ADMIN — HEPARIN SODIUM 5000 UNITS: 5000 INJECTION INTRAVENOUS; SUBCUTANEOUS at 06:27

## 2024-09-19 NOTE — ASSESSMENT & PLAN NOTE
Patient presenting with weakness, shaking/tremor and fatgiue and found to be COVID-positive  Patient reports not feeling well overall for last few days prior to presentation to the emergency room.  O2 saturation 95 to 96% room air.  Continue with supportive care.  Wife was recently sick with similar illness  Monitor for fevers, PRN tylenol  Stable for discharge from this perspective

## 2024-09-19 NOTE — DISCHARGE SUMMARY
Discharge Summary - Hospitalist   Name: Reyes Lira 78 y.o. male I MRN: [id number]  Unit/Bed#: 2 E 279-01 I Date of Admission: 9/17/2024   Date of Service: 9/19/2024 I Hospital Day: 2     Assessment & Plan  Weakness  Patient was found to have weakness in the setting of COVID.  However described increased weakness in the left side compared to the right.    CTH negative. 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 There is flow reconstitution at mid/distal V2 segment with multifocal severe stenosis. The V3 and V4 segments are patent with high-grade stenosis at distal V4 segment. Atherosclerotic stenosis of bilateral cavernous and supraclinoid intracranial ICAs, severe stenosis in the distal left cavernous ICA. No hemodynamically significant stenosis in bilateral cervical carotid and right vertebral arteries.   Brain MRI report noted negative for acute infarct within changes remote lacunar infarct.    Currently patient is asymptomatic and back to his baseline.  Denies any other new acute complaints.  2D echo report noted with EF of 55% with grade 1 diastolic function.  Neurology recommended to continue aspirin 81 mg daily, atorvastatin 40 mg daily.  PT OT recommendation noted for level 1 however patient is adamant about going home.  Adamantly requesting to go home with VNA services and physical therapy at home.  Currently he is otherwise hemodynamically stable for discharge.        COVID  Patient presenting with weakness, shaking/tremor and fatgiue and found to be COVID-positive  Patient reports not feeling well overall for last few days prior to presentation to the emergency room.  O2 saturation 95 to 96% room air.  Continue with supportive care.  Wife was recently sick with similar illness  Monitor for fevers, PRN tylenol  Stable for discharge from this perspective  Myoclonus  Unclear etiology.  Brain MRI negative for acute finding.  Suspected secondary to COVID-19  "infection.    Currently patient is asymptomatic.  PT OT recommendation noted.  Patient is adamantly declining to go to inpatient rehab and requesting to go home with VNA.    HTN (hypertension)  Controlled.  Patient takes amlodipine 10 mg daily, losartan 50 mg daily, hydrochlorothiazide 12.5 mg daily at home    DM (diabetes mellitus) ([facility])  No results found for: \"HGBA1C\"    Recent Labs     09/18/24  1133 09/18/24  1653 09/19/24  0816 09/19/24  1104   POCGLU 127 220* 65 152*       Blood Sugar Average: Last 72 hrs:  (P) [id number]  History of diabetes reported  A1c 8.2  Patient reported that he takes insulin detemir 44 units in the morning and 50 units at night.  However per documentation and last office visit he was only recorded that he takes 44 units daily.  He did report he took morning determir insulin already today.       Accu-Cheks noted well-controlled.  Will decrease detemir to 15 u BID for now and monitor for hypoglycemia and recommended to take Accu-Cheks and keep a log and recommend close follow-up with primary care provider to adjust insulin as needed.  Patient is in agreement with above plan.  JOIE (acute kidney injury) ([facility])  JOIE has not resolved  Present on admission with history of CKD.  Presented with creatinine 1.38  Currently creatinine 1.27 which likely her baseline.       Medical Problems      Discharging Physician / Practitioner: Skyler Huff MD  PCP: Tyrese Mancilla MD  Admission Date:   Admission Orders (From admission, onward)       Ordered        09/17/24 1308  INPATIENT ADMISSION  Once                          Discharge Date: 09/19/24    Consultations During Hospital Stay:  Neurology      Reason for Admission: Tremors, weakness, fatigue, shakiness    Hospital Course:   Reyes Lira is a 78 y.o. male patient with past medical history of speech impairment, stuttering, hypertension, hyperlipidemia, diabetes, history of CVA, diabetic neuropathy, CKD, who originally presented to " the hospital on 9/17/2024 due to complaining of shakiness, weakness, fatigue, tremors that has been ongoing since 09/12/2024 and on further evaluation patient was tested positive for COVID-19 in the emergency room.  Reports that his wife is also feeling ill.  O2 saturation remained 96 to 97% on room air.  Chest x-ray report noted negative for acute finding.  CTA head and neck and brain MRI report noted as above however negative for acute CVA.  Patient was seen by neurology and symptoms of myoclonus, shakiness, tremors likely due to COVID-19 infection and weakness which have now resolved after conservative treatment for COVID-19.  Neurology recommended to continue aspirin 81 mg daily, Lipitor 40 mg daily.  2D echo report noted EF of 55% with grade 1 diastolic dysfunction.  Currently patient is hemodynamically stable for discharge.  PT OT recommendation noted for level 1 however patient is adamant about not going to inpatient rehab and requesting to go home with VNA services.  Lipid panel reviewed, today CBC and BMP reviewed.  Patient does report having episodes of low sugar at home therefore I have recommended to decrease Levemir dose to 15 units twice daily and keep a log of Accu-Cheks and follow-up with primary care provider to adjust insulin as needed.  Patient is agreeable with above plan.  No other events reported.  Current hemodynamically stable for discharge.  Refer to earlier notes for further clarification.        Please see above list of diagnoses and related plan for additional information.     Condition at Discharge: good    Discharge Day Visit / Exam:   Subjective: Seen during a.m. rounds.  Patient appears comfortable not in distress.  Denies chest pain, dyspnea, fever, chills, nausea, vomiting, myoclonus, tremors, weakness, any other new complaints.  Reports feeling close to his baseline and eager to go home.    Vitals: Blood Pressure: 124/89 (09/19/24 0833)  Pulse: 82 (09/19/24 0833)  Temperature: 98.3  °F (36.8 °C) (09/19/24 0700)  Temp Source: Oral (09/19/24 0300)  Respirations: 18 (09/19/24 0700)  Height: 6' (182.9 cm) (09/17/24 1536)  Weight - Scale: 99.8 kg (220 lb) (09/17/24 1536)  SpO2: 97 % (09/19/24 0840)  Exam:   Physical Exam  Constitutional:       General: He is not in acute distress.     Appearance: He is not toxic-appearing.      Comments: Elderly male patient, acutely nontoxic appearing.  Reports that he does have history of speech impairment and stuttering.   HENT:      Head: Normocephalic.   Cardiovascular:      Rate and Rhythm: Normal rate.      Pulses: Normal pulses.   Pulmonary:      Effort: Pulmonary effort is normal. No respiratory distress.      Breath sounds: Normal breath sounds. No wheezing.   Abdominal:      General: Bowel sounds are normal. There is no distension.      Palpations: Abdomen is soft.      Tenderness: There is no abdominal tenderness.   Musculoskeletal:      Right lower leg: No edema.      Left lower leg: No edema.   Neurological:      Mental Status: He is alert and oriented to person, place, and time. Mental status is at baseline.   Psychiatric:         Mood and Affect: Mood normal.         Behavior: Behavior normal.          Discussion with Family:  offered to call his wife however pt reports that she is also feeling sick and he will talk to her. .     Discharge instructions/Information to patient and family:   See after visit summary for information provided to patient and family.      Provisions for Follow-Up Care:  See after visit summary for information related to follow-up care and any pertinent home health orders.      Mobility at time of Discharge:   Basic Mobility Inpatient Raw Score: 14  -HLM Goal: 4: Move to chair/commode  -HLM Achieved: 1: Laying in bed       Disposition:   Home with VNA Services (Reminder: Complete face to face encounter)  Adamantly declining to go to inpatient rehab.    Planned Readmission: At risk of readmission.    Discharge  Medications:  See after visit summary for reconciled discharge medications provided to patient and/or family.      Administrative Statements   Discharge Statement:  I have spent a total time of 35 minutes in caring for this patient on the day of the visit/encounter. >30 minutes of time was spent on: Risks and benefits of tx options, Patient and family education, Counseling / Coordination of care, and Documenting in the medical record.    **Please Note: This note may have been constructed using a voice recognition system**

## 2024-09-19 NOTE — DISCHARGE INSTR - AVS FIRST PAGE
Recommend close follow-up with primary care provider within 1 week of discharge.  Recommended to decrease insulin dose to 15 units twice daily and keep a log for fingerstick glucose monitoring and follow-up with primary care provider to adjust insulin dosing.

## 2024-09-19 NOTE — ASSESSMENT & PLAN NOTE
Patient was found to have weakness in the setting of COVID.  However described increased weakness in the left side compared to the right.    CTH negative. 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 There is flow reconstitution at mid/distal V2 segment with multifocal severe stenosis. The V3 and V4 segments are patent with high-grade stenosis at distal V4 segment. Atherosclerotic stenosis of bilateral cavernous and supraclinoid intracranial ICAs, severe stenosis in the distal left cavernous ICA. No hemodynamically significant stenosis in bilateral cervical carotid and right vertebral arteries.   Brain MRI report noted negative for acute infarct within changes remote lacunar infarct.    Currently patient is asymptomatic and back to his baseline.  Denies any other new acute complaints.  2D echo report noted with EF of 55% with grade 1 diastolic function.  Neurology recommended to continue aspirin 81 mg daily, atorvastatin 40 mg daily.  PT OT recommendation noted for level 1 however patient is adamant about going home.  Adamantly requesting to go home with VNA services and physical therapy at home.  Currently he is otherwise hemodynamically stable for discharge.

## 2024-09-19 NOTE — PLAN OF CARE
Problem: Potential for Falls  Goal: Patient will remain free of falls  Description: INTERVENTIONS:  - Educate patient/family on patient safety including physical limitations  - Instruct patient to call for assistance with activity   - Consult OT/PT to assist with strengthening/mobility   - Keep Call bell within reach  - Keep bed low and locked with side rails adjusted as appropriate  - Keep care items and personal belongings within reach  - Initiate and maintain comfort rounds  - Make Fall Risk Sign visible to staff  - Offer Toileting every  Hours, in advance of need  - Initiate/Maintain alarm  - Obtain necessary fall risk management equipment  - Apply yellow socks and bracelet for high fall risk patients  - Consider moving patient to room near nurses station  Outcome: Progressing     Problem: Prexisting or High Potential for Compromised Skin Integrity  Goal: Skin integrity is maintained or improved  Description: INTERVENTIONS:  - Identify patients at risk for skin breakdown  - Assess and monitor skin integrity  - Assess and monitor nutrition and hydration status  - Monitor labs   - Assess for incontinence   - Turn and reposition patient  - Assist with mobility/ambulation  - Relieve pressure over bony prominences  - Avoid friction and shearing  - Provide appropriate hygiene as needed including keeping skin clean and dry  - Evaluate need for skin moisturizer/barrier cream  - Collaborate with interdisciplinary team   - Patient/family teaching  - Consider wound care consult   Outcome: Progressing     Problem: Neurological Deficit  Goal: Neurological status is stable or improving  Description: Interventions:  - Monitor and assess patient's level of consciousness, motor function, sensory function, and level of assistance needed for ADLs.   - Monitor and report changes from baseline. Collaborate with interdisciplinary team to initiate plan and implement interventions as ordered.   - Provide and maintain a safe  environment.  - Consider seizure precautions.  - Consider fall precautions.  - Consider aspiration precautions.  - Consider bleeding precautions.  Outcome: Progressing     Problem: Activity Intolerance/Impaired Mobility  Goal: Mobility/activity is maintained at optimum level for patient  Description: Interventions:  - Assess and monitor patient  barriers to mobility and need for assistive/adaptive devices.  - Assess patient's emotional response to limitations.  - Collaborate with interdisciplinary team and initiate plans and interventions as ordered.  - Encourage independent activity per ability.  - Maintain proper body alignment.  - Perform active/passive rom as tolerated/ordered.  - Plan activities to conserve energy.  - Turn patient as appropriate  Outcome: Progressing     Problem: Communication Impairment  Goal: Ability to express needs and understand communication  Description: Assess patient's communication skills and ability to understand information.  Patient will demonstrate use of effective communication techniques, alternative methods of communication and understanding even if not able to speak.     - Encourage communication and provide alternate methods of communication as needed.  - Collaborate with case management/ for discharge needs.  - Include patient/family/caregiver in decisions related to communication.  Outcome: Progressing     Problem: Potential for Aspiration  Goal: Non-ventilated patient's risk of aspiration is minimized  Description: Assess and monitor vital signs, respiratory status, and labs (WBC).  Monitor for signs of aspiration (tachypnea, cough, rales, wheezing, cyanosis, fever).    - Assess and monitor patient's ability to swallow.  - Place patient up in chair to eat if possible.  - HOB up at 90 degrees to eat if unable to get patient up into chair.  - Supervise patient during oral intake.   - Instruct patient/ family to take small bites.  - Instruct patient/ family to  take small single sips when taking liquids.  - Follow patient-specific strategies generated by speech pathologist.  Outcome: Progressing  Goal: Ventilated patient's risk of aspiration is minimized  Description: Assess and monitor vital signs, respiratory status, airway cuff pressure, and labs (WBC).  Monitor for signs of aspiration (tachypnea, cough, rales, wheezing, cyanosis, fever).    - Elevate head of bed 30 degrees if patient has tube feeding.  - Monitor tube feeding.  Outcome: Progressing     Problem: Nutrition  Goal: Nutrition/Hydration status is improving  Description: Monitor and assess patient's nutrition/hydration status for malnutrition (ex- brittle hair, bruises, dry skin, pale skin and conjunctiva, muscle wasting, smooth red tongue, and disorientation). Collaborate with interdisciplinary team and initiate plan and interventions as ordered.  Monitor patient's weight and dietary intake as ordered or per policy. Utilize nutrition screening tool and intervene per policy. Determine patient's food preferences and provide high-protein, high-caloric foods as appropriate.     - Assist patient with eating.  - Allow adequate time for meals.  - Encourage patient to take dietary supplement as ordered.  - Collaborate with clinical nutritionist.  - Include patient/family/caregiver in decisions related to nutrition.  Outcome: Progressing     Problem: Knowledge Deficit  Goal: Patient/family/caregiver demonstrates understanding of disease process, treatment plan, medications, and discharge instructions  Description: Complete learning assessment and assess knowledge base.  Interventions:  - Provide teaching at level of understanding  - Provide teaching via preferred learning methods  Outcome: Progressing     Problem: NEUROSENSORY - ADULT  Goal: Achieves stable or improved neurological status  Description: INTERVENTIONS  - Monitor and report changes in neurological status  - Monitor vital signs such as temperature, blood  pressure, glucose, and any other labs ordered   - Initiate measures to prevent increased intracranial pressure  - Monitor for seizure activity and implement precautions if appropriate      Outcome: Progressing  Goal: Remains free of injury related to seizures activity  Description: INTERVENTIONS  - Maintain airway, patient safety  and administer oxygen as ordered  - Monitor patient for seizure activity, document and report duration and description of seizure to physician/advanced practitioner  - If seizure occurs,  ensure patient safety during seizure  - Reorient patient post seizure  - Seizure pads on all 4 side rails  - Instruct patient/family to notify RN of any seizure activity including if an aura is experienced  - Instruct patient/family to call for assistance with activity based on nursing assessment  - Administer anti-seizure medications if ordered    Outcome: Progressing  Goal: Achieves maximal functionality and self care  Description: INTERVENTIONS  - Monitor swallowing and airway patency with patient fatigue and changes in neurological status  - Encourage and assist patient to increase activity and self care.   - Encourage visually impaired, hearing impaired and aphasic patients to use assistive/communication devices  Outcome: Progressing

## 2024-09-19 NOTE — CASE MANAGEMENT
Case Management Discharge Planning Note    Patient name Reyes Lira  Location 2 [facility] 279/2 E 279-01 MRN [id number]  : 1945 Date 2024       Current Admission Date: 2024  Current Admission Diagnosis:Weakness   Patient Active Problem List    Diagnosis Date Noted Date Diagnosed    COVID 2024     Weakness 2024     JOIE (acute kidney injury) ([facility]) 2024     Myoclonus 2024     HTN (hypertension) 2024     DM (diabetes mellitus) ([facility]) 2024       LOS (days): 2  Geometric Mean LOS (GMLOS) (days):   Days to GMLOS:     OBJECTIVE:  Risk of Unplanned Readmission Score: 9.64         Current admission status: Inpatient   Preferred Pharmacy:   [id number] - [address] [address]  Phone: [phone] Fax: [phone]    Primary Care Provider: Tyrese Mancilla MD    Primary Insurance: [facility]  Secondary Insurance: MEDICARE    DISCHARGE DETAILS:    Requested Home Health Care         Is the patient interested in HHC at discharge?: Yes  Home Health Discipline requested:: Physical Therapy, Occupational Therapy  [facility] Health Agency Name:: [facility] External Referral Reason (only applicable if external HHA name selected): Services not provided in network or near patient location  Home Health Follow-Up Provider:: PCP  Home Health Services Needed:: Evaluate Functional Status and Safety, Gait/ADL Training, Strengthening/Theraputic Exercises to Improve Function  Homebound Criteria Met:: Requires the Assistance of Another Person for Safe Ambulation or to Leave the Home, Uses an Assist Device (i.e. cane, walker, etc)  Supporting Clincal Findings:: Limited Endurance, Fatigues Easliy in Short Distances    DME Referral Provided  Referral made for DME?: No    Other Referral/Resources/Interventions Provided:  Interventions: HHC  Referral Comments: Ace WILCOX in [facility]  and updated patient's AVS to reflect the same. CM then called the [facility] at [phone] and left a message at ext. [phone] for home care orders.

## 2024-09-19 NOTE — NURSING NOTE
"Multiple attempts to replace IV. Including w/ultrasound guidance. All unsuccessful. Patient requesting; refusing IV replacement, \"I am going to be leaving tomorrow, I would not like to be poked anymore. If you can't find a vein, you can't find a vein.\" Understands purpose of; risks related to loss of IV. SLIM made aware.   "

## 2024-09-19 NOTE — ASSESSMENT & PLAN NOTE
"No results found for: \"HGBA1C\"    Recent Labs     09/18/24  1133 09/18/24  1653 09/19/24  0816 09/19/24  1104   POCGLU 127 220* 65 152*       Blood Sugar Average: Last 72 hrs:  (P) [id number]  History of diabetes reported  A1c 8.2  Patient reported that he takes insulin detemir 44 units in the morning and 50 units at night.  However per documentation and last office visit he was only recorded that he takes 44 units daily.  He did report he took morning determir insulin already today.       Accu-Cheks noted well-controlled.  Will decrease detemir to 15 u BID for now and monitor for hypoglycemia and recommended to take Accu-Cheks and keep a log and recommend close follow-up with primary care provider to adjust insulin as needed.  Patient is in agreement with above plan.  "

## 2024-09-19 NOTE — ASSESSMENT & PLAN NOTE
Controlled.  Patient takes amlodipine 10 mg daily, losartan 50 mg daily, hydrochlorothiazide 12.5 mg daily at home

## 2024-09-19 NOTE — ASSESSMENT & PLAN NOTE
Unclear etiology.  Brain MRI negative for acute finding.  Suspected secondary to COVID-19 infection.    Currently patient is asymptomatic.  PT OT recommendation noted.  Patient is adamantly declining to go to inpatient rehab and requesting to go home with VNA.

## 2024-09-25 NOTE — TELEPHONE ENCOUNTER
Called patient and spoke with him regarding HFU. Patient declined to schedule. I did advise him that he can schedule an HFU up to 1 yr from his d/c date, anything after that would be a new patient appt. He verbalized understanding.     Not scheduling HFU at this time      Thank you,     [facility]        HFU/ [facility] D[facility]EL[facility]/ [place]      DC- 9/19/2024- HOME     ----- Message from Etta Farias PA-C sent at 9/18/2024 11:59 AM EDT -----  Regarding: HFU  Reyes Lira will need follow-up in  8 weeks  with general neurology team for Other = ATTENDING in 60 minute appointment. They will not require outpatient neurological testing.